# Patient Record
Sex: FEMALE | Race: BLACK OR AFRICAN AMERICAN | Employment: UNEMPLOYED
[De-identification: names, ages, dates, MRNs, and addresses within clinical notes are randomized per-mention and may not be internally consistent; named-entity substitution may affect disease eponyms.]

---

## 2017-01-13 ENCOUNTER — TELEPHONE (OUTPATIENT)
Dept: INTERNAL MEDICINE | Facility: CLINIC | Age: 52
End: 2017-01-13

## 2017-02-28 ENCOUNTER — TELEPHONE (OUTPATIENT)
Dept: INTERNAL MEDICINE | Facility: CLINIC | Age: 52
End: 2017-02-28

## 2017-05-22 DIAGNOSIS — I10 ESSENTIAL HYPERTENSION: ICD-10-CM

## 2017-05-22 RX ORDER — LOSARTAN POTASSIUM AND HYDROCHLOROTHIAZIDE 25; 100 MG/1; MG/1
TABLET ORAL
Qty: 30 TABLET | Refills: 2 | Status: SHIPPED | OUTPATIENT
Start: 2017-05-22 | End: 2017-06-01 | Stop reason: SDUPTHER

## 2017-06-01 ENCOUNTER — HOSPITAL ENCOUNTER (OUTPATIENT)
Age: 52
Setting detail: SPECIMEN
Discharge: HOME | End: 2017-06-01
Payer: MEDICARE

## 2017-06-01 ENCOUNTER — OFFICE VISIT (OUTPATIENT)
Dept: INTERNAL MEDICINE | Age: 52
End: 2017-06-01
Payer: MEDICARE

## 2017-06-01 VITALS
HEART RATE: 67 BPM | SYSTOLIC BLOOD PRESSURE: 158 MMHG | DIASTOLIC BLOOD PRESSURE: 84 MMHG | HEIGHT: 63 IN | BODY MASS INDEX: 44.93 KG/M2 | WEIGHT: 253.6 LBS

## 2017-06-01 DIAGNOSIS — Z13.9 SCREENING: ICD-10-CM

## 2017-06-01 DIAGNOSIS — I10 ESSENTIAL HYPERTENSION: ICD-10-CM

## 2017-06-01 DIAGNOSIS — R73.03 PREDIABETES: ICD-10-CM

## 2017-06-01 DIAGNOSIS — E78.00 PURE HYPERCHOLESTEROLEMIA: Primary | ICD-10-CM

## 2017-06-01 DIAGNOSIS — G56.03 BILATERAL CARPAL TUNNEL SYNDROME: ICD-10-CM

## 2017-06-01 DIAGNOSIS — E78.00 PURE HYPERCHOLESTEROLEMIA: ICD-10-CM

## 2017-06-01 DIAGNOSIS — G47.33 OSA (OBSTRUCTIVE SLEEP APNEA): ICD-10-CM

## 2017-06-01 LAB
ESTIMATED AVERAGE GLUCOSE: 131 MG/DL
HBA1C MFR BLD: 6.2 % (ref 4–6)
HEPATITIS C ANTIBODY: NONREACTIVE

## 2017-06-01 PROCEDURE — 86803 HEPATITIS C AB TEST: CPT

## 2017-06-01 PROCEDURE — 83036 HEMOGLOBIN GLYCOSYLATED A1C: CPT

## 2017-06-01 PROCEDURE — 99214 OFFICE O/P EST MOD 30 MIN: CPT | Performed by: INTERNAL MEDICINE

## 2017-06-01 RX ORDER — LOSARTAN POTASSIUM AND HYDROCHLOROTHIAZIDE 25; 100 MG/1; MG/1
1 TABLET ORAL DAILY
Qty: 30 TABLET | Refills: 11 | Status: SHIPPED | OUTPATIENT
Start: 2017-06-01 | End: 2017-06-22

## 2017-06-01 RX ORDER — AMLODIPINE BESYLATE 5 MG/1
5 TABLET ORAL DAILY
Qty: 30 TABLET | Refills: 11 | Status: SHIPPED | OUTPATIENT
Start: 2017-06-01 | End: 2017-06-22

## 2017-06-01 ASSESSMENT — PATIENT HEALTH QUESTIONNAIRE - PHQ9
SUM OF ALL RESPONSES TO PHQ9 QUESTIONS 1 & 2: 0
6. FEELING BAD ABOUT YOURSELF - OR THAT YOU ARE A FAILURE OR HAVE LET YOURSELF OR YOUR FAMILY DOWN: 0
9. THOUGHTS THAT YOU WOULD BE BETTER OFF DEAD, OR OF HURTING YOURSELF: 0
4. FEELING TIRED OR HAVING LITTLE ENERGY: 0
2. FEELING DOWN, DEPRESSED OR HOPELESS: 0
1. LITTLE INTEREST OR PLEASURE IN DOING THINGS: 0
8. MOVING OR SPEAKING SO SLOWLY THAT OTHER PEOPLE COULD HAVE NOTICED. OR THE OPPOSITE, BEING SO FIGETY OR RESTLESS THAT YOU HAVE BEEN MOVING AROUND A LOT MORE THAN USUAL: 0
5. POOR APPETITE OR OVEREATING: 0
SUM OF ALL RESPONSES TO PHQ QUESTIONS 1-9: 0
10. IF YOU CHECKED OFF ANY PROBLEMS, HOW DIFFICULT HAVE THESE PROBLEMS MADE IT FOR YOU TO DO YOUR WORK, TAKE CARE OF THINGS AT HOME, OR GET ALONG WITH OTHER PEOPLE: 0
3. TROUBLE FALLING OR STAYING ASLEEP: 0
7. TROUBLE CONCENTRATING ON THINGS, SUCH AS READING THE NEWSPAPER OR WATCHING TELEVISION: 0

## 2017-06-01 ASSESSMENT — ENCOUNTER SYMPTOMS
RESPIRATORY NEGATIVE: 1
EYES NEGATIVE: 1
GASTROINTESTINAL NEGATIVE: 1
ALLERGIC/IMMUNOLOGIC NEGATIVE: 1

## 2017-06-02 DIAGNOSIS — G47.33 OSA (OBSTRUCTIVE SLEEP APNEA): Primary | ICD-10-CM

## 2017-06-06 ENCOUNTER — TELEPHONE (OUTPATIENT)
Dept: INTERNAL MEDICINE | Age: 52
End: 2017-06-06

## 2017-06-22 ENCOUNTER — OFFICE VISIT (OUTPATIENT)
Dept: INTERNAL MEDICINE | Age: 52
End: 2017-06-22
Payer: MEDICARE

## 2017-06-22 ENCOUNTER — HOSPITAL ENCOUNTER (OUTPATIENT)
Dept: SLEEP CENTER | Age: 52
Discharge: HOME | End: 2017-06-22
Payer: MEDICARE

## 2017-06-22 ENCOUNTER — HOSPITAL ENCOUNTER (OUTPATIENT)
Age: 52
Setting detail: SPECIMEN
Discharge: HOME | End: 2017-06-22
Payer: MEDICARE

## 2017-06-22 VITALS
SYSTOLIC BLOOD PRESSURE: 141 MMHG | HEIGHT: 63 IN | DIASTOLIC BLOOD PRESSURE: 43 MMHG | HEART RATE: 66 BPM | WEIGHT: 255 LBS | BODY MASS INDEX: 45.18 KG/M2

## 2017-06-22 VITALS — HEIGHT: 63 IN | WEIGHT: 253 LBS | BODY MASS INDEX: 44.83 KG/M2

## 2017-06-22 DIAGNOSIS — G47.33 OSA (OBSTRUCTIVE SLEEP APNEA): ICD-10-CM

## 2017-06-22 DIAGNOSIS — Z12.4 PAP SMEAR FOR CERVICAL CANCER SCREENING: Primary | ICD-10-CM

## 2017-06-22 PROCEDURE — 99396 PREV VISIT EST AGE 40-64: CPT | Performed by: INTERNAL MEDICINE

## 2017-06-22 PROCEDURE — 95810 POLYSOM 6/> YRS 4/> PARAM: CPT

## 2017-06-22 RX ORDER — LOSARTAN POTASSIUM AND HYDROCHLOROTHIAZIDE 25; 100 MG/1; MG/1
100 TABLET ORAL DAILY
COMMUNITY
Start: 2017-04-08 | End: 2017-10-27 | Stop reason: SDUPTHER

## 2017-06-22 RX ORDER — NYSTATIN 100000 U/G
1000 CREAM TOPICAL
COMMUNITY
Start: 2017-04-08 | End: 2017-06-22 | Stop reason: SDUPTHER

## 2017-06-22 RX ORDER — NYSTATIN 100000 U/G
1000 CREAM TOPICAL PRN
Qty: 1 TUBE | Refills: 0 | Status: SHIPPED | OUTPATIENT
Start: 2017-06-22 | End: 2017-07-07 | Stop reason: SDUPTHER

## 2017-06-22 RX ORDER — AMLODIPINE BESYLATE 5 MG/1
5 TABLET ORAL
COMMUNITY
Start: 2017-06-01 | End: 2017-10-27 | Stop reason: SDUPTHER

## 2017-06-22 ASSESSMENT — SLEEP AND FATIGUE QUESTIONNAIRES
ESS TOTAL SCORE: 14
HOW LIKELY ARE YOU TO NOD OFF OR FALL ASLEEP IN A CAR, WHILE STOPPED FOR A FEW MINUTES IN TRAFFIC: 0
HOW LIKELY ARE YOU TO NOD OFF OR FALL ASLEEP WHILE SITTING AND TALKING TO SOMEONE: 0
HOW LIKELY ARE YOU TO NOD OFF OR FALL ASLEEP WHILE WATCHING TV: 3
HOW LIKELY ARE YOU TO NOD OFF OR FALL ASLEEP WHILE SITTING AND READING: 3
HOW LIKELY ARE YOU TO NOD OFF OR FALL ASLEEP WHILE LYING DOWN TO REST IN THE AFTERNOON WHEN CIRCUMSTANCES PERMIT: 3
NECK CIRCUMFERENCE (INCHES): 37
HOW LIKELY ARE YOU TO NOD OFF OR FALL ASLEEP WHEN YOU ARE A PASSENGER IN A CAR FOR AN HOUR WITHOUT A BREAK: 0
HOW LIKELY ARE YOU TO NOD OFF OR FALL ASLEEP WHILE SITTING QUIETLY AFTER LUNCH WITHOUT ALCOHOL: 3
HOW LIKELY ARE YOU TO NOD OFF OR FALL ASLEEP WHILE SITTING INACTIVE IN A PUBLIC PLACE: 2

## 2017-06-23 ENCOUNTER — HOSPITAL ENCOUNTER (OUTPATIENT)
Dept: MAMMOGRAPHY | Age: 52
Discharge: HOME | End: 2017-06-23
Payer: MEDICARE

## 2017-06-23 DIAGNOSIS — Z13.9 SCREENING: ICD-10-CM

## 2017-06-23 LAB — CYTOLOGY REPORT: NORMAL

## 2017-06-23 PROCEDURE — 77063 BREAST TOMOSYNTHESIS BI: CPT

## 2017-07-01 ENCOUNTER — HOSPITAL ENCOUNTER (OUTPATIENT)
Dept: SLEEP CENTER | Age: 52
Discharge: HOME | End: 2017-07-01
Payer: MEDICARE

## 2017-07-01 DIAGNOSIS — G47.33 OSA (OBSTRUCTIVE SLEEP APNEA): ICD-10-CM

## 2017-07-01 PROCEDURE — 95811 POLYSOM 6/>YRS CPAP 4/> PARM: CPT

## 2017-07-07 ENCOUNTER — TELEPHONE (OUTPATIENT)
Dept: INTERNAL MEDICINE | Age: 52
End: 2017-07-07

## 2017-07-07 RX ORDER — NYSTATIN 100000 U/G
CREAM TOPICAL DAILY PRN
Qty: 1 TUBE | Refills: 0 | Status: SHIPPED | OUTPATIENT
Start: 2017-07-07 | End: 2017-10-27 | Stop reason: SDUPTHER

## 2017-10-27 ENCOUNTER — OFFICE VISIT (OUTPATIENT)
Dept: INTERNAL MEDICINE | Age: 52
End: 2017-10-27
Payer: MEDICARE

## 2017-10-27 ENCOUNTER — HOSPITAL ENCOUNTER (OUTPATIENT)
Age: 52
Setting detail: SPECIMEN
Discharge: HOME | End: 2017-10-27
Payer: MEDICARE

## 2017-10-27 VITALS
HEIGHT: 63 IN | BODY MASS INDEX: 44.47 KG/M2 | WEIGHT: 251 LBS | DIASTOLIC BLOOD PRESSURE: 75 MMHG | HEART RATE: 72 BPM | OXYGEN SATURATION: 100 % | SYSTOLIC BLOOD PRESSURE: 139 MMHG

## 2017-10-27 DIAGNOSIS — E78.00 PURE HYPERCHOLESTEROLEMIA: ICD-10-CM

## 2017-10-27 DIAGNOSIS — M79.671 PAIN IN BOTH FEET: ICD-10-CM

## 2017-10-27 DIAGNOSIS — M79.672 PAIN IN BOTH FEET: ICD-10-CM

## 2017-10-27 DIAGNOSIS — R73.03 PREDIABETES: ICD-10-CM

## 2017-10-27 DIAGNOSIS — E66.01 MORBID OBESITY WITH BODY MASS INDEX (BMI) OF 40.0 OR HIGHER (HCC): ICD-10-CM

## 2017-10-27 DIAGNOSIS — Z99.89 OSA ON CPAP: ICD-10-CM

## 2017-10-27 DIAGNOSIS — D64.9 ANEMIA, UNSPECIFIED TYPE: ICD-10-CM

## 2017-10-27 DIAGNOSIS — G47.33 OSA ON CPAP: ICD-10-CM

## 2017-10-27 DIAGNOSIS — I10 ESSENTIAL HYPERTENSION: Primary | ICD-10-CM

## 2017-10-27 DIAGNOSIS — I10 ESSENTIAL HYPERTENSION: ICD-10-CM

## 2017-10-27 LAB
ANION GAP SERPL CALCULATED.3IONS-SCNC: 16 MMOL/L (ref 9–17)
BUN BLDV-MCNC: 11 MG/DL (ref 6–20)
BUN/CREAT BLD: NORMAL (ref 9–20)
CALCIUM SERPL-MCNC: 9.7 MG/DL (ref 8.6–10.4)
CHLORIDE BLD-SCNC: 98 MMOL/L (ref 98–107)
CHOLESTEROL/HDL RATIO: 4.4
CHOLESTEROL: 136 MG/DL
CO2: 25 MMOL/L (ref 20–31)
CREAT SERPL-MCNC: 0.58 MG/DL (ref 0.5–0.9)
ESTIMATED AVERAGE GLUCOSE: 134 MG/DL
FERRITIN: 84 UG/L (ref 13–150)
FOLATE: 6.5 NG/ML
GFR AFRICAN AMERICAN: >60 ML/MIN
GFR NON-AFRICAN AMERICAN: >60 ML/MIN
GFR SERPL CREATININE-BSD FRML MDRD: NORMAL ML/MIN/{1.73_M2}
GFR SERPL CREATININE-BSD FRML MDRD: NORMAL ML/MIN/{1.73_M2}
GLUCOSE BLD-MCNC: 97 MG/DL (ref 70–99)
HBA1C MFR BLD: 6.3 % (ref 4–6)
HCT VFR BLD CALC: 35.9 % (ref 36–46)
HDLC SERPL-MCNC: 31 MG/DL
HEMOGLOBIN: 11.6 G/DL (ref 12–16)
IRON SATURATION: 19 % (ref 20–55)
IRON: 56 UG/DL (ref 37–145)
LDL CHOLESTEROL: 85 MG/DL (ref 0–130)
MCH RBC QN AUTO: 27.5 PG (ref 26–34)
MCHC RBC AUTO-ENTMCNC: 32.5 G/DL (ref 31–37)
MCV RBC AUTO: 84.7 FL (ref 80–100)
PDW BLD-RTO: 14 % (ref 12.5–15.4)
PLATELET # BLD: 317 K/UL (ref 140–450)
PMV BLD AUTO: 9.7 FL (ref 6–12)
POTASSIUM SERPL-SCNC: 3.8 MMOL/L (ref 3.7–5.3)
RBC # BLD: 4.23 M/UL (ref 4–5.2)
SODIUM BLD-SCNC: 139 MMOL/L (ref 135–144)
TOTAL IRON BINDING CAPACITY: 295 UG/DL (ref 250–450)
TRIGL SERPL-MCNC: 99 MG/DL
TSH SERPL DL<=0.05 MIU/L-ACNC: 2.27 MIU/L (ref 0.3–5)
UNSATURATED IRON BINDING CAPACITY: 239 UG/DL (ref 112–347)
VITAMIN B-12: 525 PG/ML (ref 211–946)
VLDLC SERPL CALC-MCNC: ABNORMAL MG/DL (ref 1–30)
WBC # BLD: 7.9 K/UL (ref 3.5–11)

## 2017-10-27 PROCEDURE — 82728 ASSAY OF FERRITIN: CPT

## 2017-10-27 PROCEDURE — 82746 ASSAY OF FOLIC ACID SERUM: CPT

## 2017-10-27 PROCEDURE — 3014F SCREEN MAMMO DOC REV: CPT | Performed by: INTERNAL MEDICINE

## 2017-10-27 PROCEDURE — 80061 LIPID PANEL: CPT

## 2017-10-27 PROCEDURE — 1036F TOBACCO NON-USER: CPT | Performed by: INTERNAL MEDICINE

## 2017-10-27 PROCEDURE — 83036 HEMOGLOBIN GLYCOSYLATED A1C: CPT

## 2017-10-27 PROCEDURE — G8417 CALC BMI ABV UP PARAM F/U: HCPCS | Performed by: INTERNAL MEDICINE

## 2017-10-27 PROCEDURE — G8484 FLU IMMUNIZE NO ADMIN: HCPCS | Performed by: INTERNAL MEDICINE

## 2017-10-27 PROCEDURE — 82607 VITAMIN B-12: CPT

## 2017-10-27 PROCEDURE — 85027 COMPLETE CBC AUTOMATED: CPT

## 2017-10-27 PROCEDURE — 36415 COLL VENOUS BLD VENIPUNCTURE: CPT

## 2017-10-27 PROCEDURE — 3017F COLORECTAL CA SCREEN DOC REV: CPT | Performed by: INTERNAL MEDICINE

## 2017-10-27 PROCEDURE — G8427 DOCREV CUR MEDS BY ELIG CLIN: HCPCS | Performed by: INTERNAL MEDICINE

## 2017-10-27 PROCEDURE — 83550 IRON BINDING TEST: CPT

## 2017-10-27 PROCEDURE — 99214 OFFICE O/P EST MOD 30 MIN: CPT | Performed by: INTERNAL MEDICINE

## 2017-10-27 PROCEDURE — 84443 ASSAY THYROID STIM HORMONE: CPT

## 2017-10-27 PROCEDURE — 83540 ASSAY OF IRON: CPT

## 2017-10-27 PROCEDURE — 80048 BASIC METABOLIC PNL TOTAL CA: CPT

## 2017-10-27 RX ORDER — NYSTATIN 100000 U/G
CREAM TOPICAL DAILY PRN
Qty: 1 TUBE | Refills: 0 | Status: SHIPPED | OUTPATIENT
Start: 2017-10-27 | End: 2020-02-20 | Stop reason: SDUPTHER

## 2017-10-27 RX ORDER — LOSARTAN POTASSIUM AND HYDROCHLOROTHIAZIDE 25; 100 MG/1; MG/1
100 TABLET ORAL DAILY
Qty: 30 TABLET | Refills: 5 | Status: SHIPPED | OUTPATIENT
Start: 2017-10-27 | End: 2018-06-30 | Stop reason: SDUPTHER

## 2017-10-27 RX ORDER — AMLODIPINE BESYLATE 5 MG/1
5 TABLET ORAL DAILY
Qty: 30 TABLET | Refills: 5 | Status: SHIPPED | OUTPATIENT
Start: 2017-10-27 | End: 2018-06-25 | Stop reason: SDUPTHER

## 2017-10-27 ASSESSMENT — ENCOUNTER SYMPTOMS
BLURRED VISION: 0
EYE REDNESS: 0
NAUSEA: 0
CONSTIPATION: 0
SHORTNESS OF BREATH: 0
ABDOMINAL PAIN: 0
COUGH: 0

## 2017-10-27 NOTE — PROGRESS NOTES
[facility] MEDICINE [facility]    Progress Note    Date of patient's visit: 10/27/2017    Patient's Name:  Jan Brewer    YOB: 1965            Patient Care Team:  Lexie Carvalho MD as PCP - General    REASON FOR VISIT: Routine outpatient follow     Chief Complaint   Patient presents with    Diabetes     patient states that she is present to get dm shoes order   [facility] Maintenance     patient refused vaccines    Medication Refill     all meds         HISTORY OF PRESENT ILLNESS:    History was obtained from the patient. Jan Brewer is a 45 y.o. is here for Follow-up on her chronic medical problems including hypertension and prediabetes. She is morbidly obese. She says she's been having some pain in her feet and occasional tingling and numbness. She has history of prediabetes. She says she was diabetic shoes. She says she has been seeing Dr. Gely Dolan for her feet. He had prescribed insoles and she has been using them for the last 3 months but it hasn't helped. She has another appointment to see his partner next week. She drives a ambulance for medical transportation. She says she has to get in and out of the ambulance. She has MACHELLE which was recently diagnosed. She says she is using CPAP. She refuses flu or pneumococcal vaccines. She has prediabetes. She was started on metformin but she said she immediately started having diarrhea and refuses to take it anymore. Patient: Jan Brewer                                          :                                                         1965                      Date:  10/27/2016                         Procedure:  Colonoscopy      Indication:  Screening, average risk     Previous Colonoscopy:  None     Withdrawal Time:  10 minutes     Findings   1. Minimal diverticulosis  2. Small internal hemorrhoids     Recommendations  1. Repeat colonoscopy in ten (10) years.   Past Medical PHYSICAL EXAM:      Vitals:    10/27/17 1402   BP: 139/75   Site: Left Arm   Position: Sitting   Cuff Size: Large Adult   Pulse: 72   SpO2: 100%   Weight: 251 lb (113.9 kg)   Height: 5' 3\" (1.6 m)     Body mass index is 44.46 kg/m². BP Readings from Last 3 Encounters:   10/27/17 139/75   06/22/17 (!) 141/43   06/01/17 (!) 158/84        Wt Readings from Last 3 Encounters:   10/27/17 251 lb (113.9 kg)   06/22/17 253 lb (114.8 kg)   06/22/17 255 lb (115.7 kg)       Physical Exam      HENT:  Normocephalic, Atraumatic,  Neck- Normal range of motion, No tenderness, Supple, No stridor. Eyes:  PERRL, EOMI, Conjunctiva normal, No discharge. Respiratory:  Normal breath sounds, No respiratory distress, No wheezing, No chest tenderness. Cardiovascular:  Normal heart rate, Normal rhythm, No murmurs, No rubs, No gallops. GI:  Bowel sounds normal, Soft, No tenderness  Musculoskeletal:  Intact distal pulses, No edema  Integument:  Warm, Dry, No erythema, No rash. Lymphatic:  No lymphadenopathy noted. Neurologic:  Alert & oriented x 3, Normal motor function, Normal sensory function, No focal deficits noted.    Psychiatric:  Affect normal    LABORATORY FINDINGS:    CBC:  Lab Results   Component Value Date    WBC 7.4 08/08/2016    HGB 10.9 08/08/2016     08/08/2016     04/29/2012     BMP:    Lab Results   Component Value Date     08/08/2016    K 4.0 08/08/2016     08/08/2016    CO2 28 08/08/2016    BUN 16 08/08/2016    CREATININE 0.81 08/08/2016    GLUCOSE 139 08/08/2016    GLUCOSE 119 04/29/2012     HEMOGLOBIN A1C:   Lab Results   Component Value Date    LABA1C 6.2 06/01/2017     MICROALBUMIN URINE: No results found for: MICROALBUR  FASTING LIPID PANEL:  Lab Results   Component Value Date    CHOL 133 05/13/2016    HDL 31 (L) 05/13/2016    TRIG 114 05/13/2016     Lab Results   Component Value Date    LDLCHOLESTEROL 79 05/13/2016       LIVER PROFILE:  Lab Results   Component Value Date    ALT 14 08/08/2016    AST 18 08/08/2016    PROT 7.8 08/08/2016    BILITOT 0.51 08/08/2016    BILIDIR 0.10 08/08/2016    LABALBU 3.9 08/08/2016      THYROID FUNCTION:   Lab Results   Component Value Date    TSH 1.73 05/03/2013      URINE ANALYSIS: No results found for: LABURIN  ASSESSMENT AND PLAN:    1. Essential hypertension  Same meds    - Basic Metabolic Panel; Future  - TSH without Reflex; Future    2. Prediabetes  Refuses Metformin  Monitor    - Hemoglobin A1C; Future  - TSH without Reflex; Future  - Lipid Panel; Future    3. Pain in both feet  Follow up with Podiatry  Get office notes      4. MACHELLE on CPAP  Weight loss      5. Anemia, unspecified type    - CBC; Future  - Ferritin; Future  - Iron and TIBC; Future  - Vitamin B12 & Folate; Future    6. Pure hypercholesterolemia  Statins    - Lipid Panel; Future    7. Morbid obesity with body mass index (BMI) of 40.0 or higher (HCC)  Diet changes    - TSH without Reflex; Future          FOLLOW UP AND INSTRUCTIONS:   Return in about 3 months (around 1/27/2018). 1. Roxie received counseling on the following healthy behaviors: nutrition, exercise and medication adherence    2. Reviewed prior labs and health maintenance. 3. Discussed use, benefit, and side effects of prescribed medications. Barriers to medication compliance addressed. All patient questions answered. Pt voiced understanding.        Gil Travis  Attending Physician, [address] [place] [address], Internal Medicine Residency Program  [address] [place] [address]  10/27/2017, 2:15 PM

## 2018-06-30 RX ORDER — LOSARTAN POTASSIUM AND HYDROCHLOROTHIAZIDE 25; 100 MG/1; MG/1
1 TABLET ORAL DAILY
Qty: 30 TABLET | Refills: 5 | Status: SHIPPED | OUTPATIENT
Start: 2018-06-30 | End: 2018-06-30 | Stop reason: SDUPTHER

## 2018-06-30 RX ORDER — LOSARTAN POTASSIUM AND HYDROCHLOROTHIAZIDE 25; 100 MG/1; MG/1
1 TABLET ORAL DAILY
Qty: 30 TABLET | Refills: 5 | Status: SHIPPED | OUTPATIENT
Start: 2018-06-30 | End: 2018-07-01 | Stop reason: SDUPTHER

## 2018-07-11 ENCOUNTER — OFFICE VISIT (OUTPATIENT)
Dept: INTERNAL MEDICINE | Age: 53
End: 2018-07-11
Payer: MEDICARE

## 2018-07-11 VITALS
BODY MASS INDEX: 45 KG/M2 | HEIGHT: 63 IN | SYSTOLIC BLOOD PRESSURE: 134 MMHG | DIASTOLIC BLOOD PRESSURE: 70 MMHG | HEART RATE: 73 BPM | WEIGHT: 254 LBS

## 2018-07-11 DIAGNOSIS — I10 ESSENTIAL HYPERTENSION: ICD-10-CM

## 2018-07-11 DIAGNOSIS — G89.29 CHRONIC BILATERAL LOW BACK PAIN WITH LEFT-SIDED SCIATICA: ICD-10-CM

## 2018-07-11 DIAGNOSIS — R73.02 IGT (IMPAIRED GLUCOSE TOLERANCE): Primary | ICD-10-CM

## 2018-07-11 DIAGNOSIS — E66.01 MORBID OBESITY WITH BMI OF 40.0-44.9, ADULT (HCC): ICD-10-CM

## 2018-07-11 DIAGNOSIS — G47.33 OSA ON CPAP: ICD-10-CM

## 2018-07-11 DIAGNOSIS — Z99.89 OSA ON CPAP: ICD-10-CM

## 2018-07-11 DIAGNOSIS — M54.42 CHRONIC BILATERAL LOW BACK PAIN WITH LEFT-SIDED SCIATICA: ICD-10-CM

## 2018-07-11 DIAGNOSIS — Z12.39 BREAST CANCER SCREENING: ICD-10-CM

## 2018-07-11 LAB — HBA1C MFR BLD: 6.4 %

## 2018-07-11 PROCEDURE — 1036F TOBACCO NON-USER: CPT | Performed by: INTERNAL MEDICINE

## 2018-07-11 PROCEDURE — 83036 HEMOGLOBIN GLYCOSYLATED A1C: CPT | Performed by: INTERNAL MEDICINE

## 2018-07-11 PROCEDURE — 3017F COLORECTAL CA SCREEN DOC REV: CPT | Performed by: INTERNAL MEDICINE

## 2018-07-11 PROCEDURE — G8427 DOCREV CUR MEDS BY ELIG CLIN: HCPCS | Performed by: INTERNAL MEDICINE

## 2018-07-11 PROCEDURE — 99213 OFFICE O/P EST LOW 20 MIN: CPT | Performed by: INTERNAL MEDICINE

## 2018-07-11 PROCEDURE — G8417 CALC BMI ABV UP PARAM F/U: HCPCS | Performed by: INTERNAL MEDICINE

## 2018-07-11 RX ORDER — LOSARTAN POTASSIUM AND HYDROCHLOROTHIAZIDE 25; 100 MG/1; MG/1
TABLET ORAL
Qty: 90 TABLET | Refills: 1 | Status: SHIPPED | OUTPATIENT
Start: 2018-07-11 | End: 2018-10-18 | Stop reason: SDUPTHER

## 2018-07-11 RX ORDER — METFORMIN HYDROCHLORIDE 500 MG/1
500 TABLET, EXTENDED RELEASE ORAL
Qty: 30 TABLET | Refills: 3 | Status: SHIPPED | OUTPATIENT
Start: 2018-07-11 | End: 2018-10-18

## 2018-07-11 RX ORDER — AMLODIPINE BESYLATE 5 MG/1
TABLET ORAL
Qty: 90 TABLET | Refills: 1 | Status: SHIPPED | OUTPATIENT
Start: 2018-07-11 | End: 2018-10-18 | Stop reason: SDUPTHER

## 2018-07-11 ASSESSMENT — ENCOUNTER SYMPTOMS
SHORTNESS OF BREATH: 0
BACK PAIN: 1
EYE REDNESS: 0
SPUTUM PRODUCTION: 0
ABDOMINAL PAIN: 0
COUGH: 0
BLURRED VISION: 0
CONSTIPATION: 0
NAUSEA: 0

## 2018-07-11 ASSESSMENT — PATIENT HEALTH QUESTIONNAIRE - PHQ9
SUM OF ALL RESPONSES TO PHQ QUESTIONS 1-9: 0
SUM OF ALL RESPONSES TO PHQ9 QUESTIONS 1 & 2: 0
2. FEELING DOWN, DEPRESSED OR HOPELESS: 0
1. LITTLE INTEREST OR PLEASURE IN DOING THINGS: 0

## 2018-07-11 NOTE — PROGRESS NOTES
Results   Component Value Date    LDLCHOLESTEROL 85 10/27/2017       LIVER PROFILE:  Lab Results   Component Value Date    ALT 14 08/08/2016    AST 18 08/08/2016    PROT 7.8 08/08/2016    BILITOT 0.51 08/08/2016    BILIDIR 0.10 08/08/2016    LABALBU 3.9 08/08/2016      THYROID FUNCTION:   Lab Results   Component Value Date    TSH 2.27 10/27/2017      URINE ANALYSIS: No results found for: LABURIN  ASSESSMENT AND PLAN:    1. IGT (impaired glucose tolerance)    - POCT glycosylated hemoglobin (Hb A1C)  - metFORMIN (GLUCOPHAGE XR) 500 MG extended release tablet; Take 1 tablet by mouth daily (with breakfast)  Dispense: 30 tablet; Refill: 3    2. Essential hypertension    - amLODIPine (NORVASC) 5 MG tablet; TAKE ONE TABLET BY MOUTH DAILY  Dispense: 90 tablet; Refill: 1  - losartan-hydrochlorothiazide (HYZAAR) 100-25 MG per tablet; TAKE ONE TABLET BY MOUTH DAILY  Dispense: 90 tablet; Refill: 1    3. Chronic bilateral low back pain with left-sided sciatica    - [facility]    4. MACHELLE on CPAP  Advised to call company to have cpap checked  Numbers provided    5. Morbid obesity with BMI of 40.0-44.9, adult (HCC)  Diet changes  exercise    6. Breast cancer screening    - [facility] DIGITAL SCREEN W CAD BILATERAL; Future          FOLLOW UP AND INSTRUCTIONS:   Return in about 3 months (around 10/11/2018). 1. Roxie received counseling on the following healthy behaviors: nutrition, exercise and medication adherence    2. Reviewed prior labs and health maintenance. 3. Discussed use, benefit, and side effects of prescribed medications. Barriers to medication compliance addressed. All patient questions answered. Pt voiced understanding.      4. Patient given educational materials - see patient instructions    Daljit Ocasio  Attending Physician, [address] [place] [address], Internal Medicine Residency Program  [address] [facility]  7/11/2018, 4:26 PM

## 2018-07-30 ENCOUNTER — TELEPHONE (OUTPATIENT)
Dept: INTERNAL MEDICINE | Age: 53
End: 2018-07-30

## 2018-07-30 RX ORDER — AMLODIPINE BESYLATE 5 MG/1
TABLET ORAL
Qty: 30 TABLET | Refills: 0 | Status: SHIPPED | OUTPATIENT
Start: 2018-07-30 | End: 2018-10-18 | Stop reason: SDUPTHER

## 2018-07-30 NOTE — TELEPHONE ENCOUNTER
Metformin does cause diarrhea. It sometimes improves after few days.  If persistent can stop metformin if desired

## 2018-08-06 ENCOUNTER — HOSPITAL ENCOUNTER (OUTPATIENT)
Dept: MAMMOGRAPHY | Age: 53
Discharge: HOME | End: 2018-08-08
Payer: MEDICARE

## 2018-08-06 DIAGNOSIS — Z12.39 BREAST CANCER SCREENING: ICD-10-CM

## 2018-08-06 PROCEDURE — 77063 BREAST TOMOSYNTHESIS BI: CPT

## 2018-10-18 ENCOUNTER — OFFICE VISIT (OUTPATIENT)
Dept: INTERNAL MEDICINE | Age: 53
End: 2018-10-18
Payer: MEDICARE

## 2018-10-18 VITALS
WEIGHT: 247 LBS | DIASTOLIC BLOOD PRESSURE: 73 MMHG | HEIGHT: 63 IN | HEART RATE: 73 BPM | SYSTOLIC BLOOD PRESSURE: 142 MMHG | BODY MASS INDEX: 43.77 KG/M2

## 2018-10-18 DIAGNOSIS — G47.33 OSA ON CPAP: ICD-10-CM

## 2018-10-18 DIAGNOSIS — K21.9 GASTROESOPHAGEAL REFLUX DISEASE, ESOPHAGITIS PRESENCE NOT SPECIFIED: ICD-10-CM

## 2018-10-18 DIAGNOSIS — R73.02 IGT (IMPAIRED GLUCOSE TOLERANCE): ICD-10-CM

## 2018-10-18 DIAGNOSIS — I10 ESSENTIAL HYPERTENSION: Primary | ICD-10-CM

## 2018-10-18 DIAGNOSIS — R20.0 NUMBNESS OF FEET: ICD-10-CM

## 2018-10-18 DIAGNOSIS — E66.01 MORBID OBESITY WITH BMI OF 40.0-44.9, ADULT (HCC): ICD-10-CM

## 2018-10-18 DIAGNOSIS — E78.00 PURE HYPERCHOLESTEROLEMIA: ICD-10-CM

## 2018-10-18 DIAGNOSIS — Z99.89 OSA ON CPAP: ICD-10-CM

## 2018-10-18 DIAGNOSIS — D64.9 CHRONIC ANEMIA: ICD-10-CM

## 2018-10-18 LAB — HBA1C MFR BLD: 6.1 %

## 2018-10-18 PROCEDURE — 99214 OFFICE O/P EST MOD 30 MIN: CPT | Performed by: INTERNAL MEDICINE

## 2018-10-18 PROCEDURE — 99211 OFF/OP EST MAY X REQ PHY/QHP: CPT | Performed by: INTERNAL MEDICINE

## 2018-10-18 PROCEDURE — 3017F COLORECTAL CA SCREEN DOC REV: CPT | Performed by: INTERNAL MEDICINE

## 2018-10-18 PROCEDURE — G8417 CALC BMI ABV UP PARAM F/U: HCPCS | Performed by: INTERNAL MEDICINE

## 2018-10-18 PROCEDURE — 83036 HEMOGLOBIN GLYCOSYLATED A1C: CPT | Performed by: INTERNAL MEDICINE

## 2018-10-18 PROCEDURE — G8484 FLU IMMUNIZE NO ADMIN: HCPCS | Performed by: INTERNAL MEDICINE

## 2018-10-18 PROCEDURE — G8427 DOCREV CUR MEDS BY ELIG CLIN: HCPCS | Performed by: INTERNAL MEDICINE

## 2018-10-18 PROCEDURE — 1036F TOBACCO NON-USER: CPT | Performed by: INTERNAL MEDICINE

## 2018-10-18 RX ORDER — OMEPRAZOLE 20 MG/1
20 CAPSULE, DELAYED RELEASE ORAL DAILY
Qty: 30 CAPSULE | Refills: 3 | Status: SHIPPED | OUTPATIENT
Start: 2018-10-18 | End: 2019-02-26 | Stop reason: SDUPTHER

## 2018-10-18 RX ORDER — LOSARTAN POTASSIUM AND HYDROCHLOROTHIAZIDE 25; 100 MG/1; MG/1
TABLET ORAL
Qty: 90 TABLET | Refills: 1 | Status: SHIPPED | OUTPATIENT
Start: 2018-10-18 | End: 2019-02-26 | Stop reason: SDUPTHER

## 2018-10-18 RX ORDER — BLOOD PRESSURE TEST KIT
KIT MISCELLANEOUS
Qty: 1 DEVICE | Refills: 0 | Status: SHIPPED | OUTPATIENT
Start: 2018-10-18 | End: 2019-02-26 | Stop reason: SDUPTHER

## 2018-10-18 RX ORDER — AMLODIPINE BESYLATE 10 MG/1
TABLET ORAL
Qty: 90 TABLET | Refills: 1 | Status: SHIPPED | OUTPATIENT
Start: 2018-10-18 | End: 2019-02-26 | Stop reason: SDUPTHER

## 2018-10-18 NOTE — PROGRESS NOTES
[facility] MEDICINE [facility]OC[facility]    Progress Note    Date of patient's visit: 10/18/2018    Patient's Name:  Julieta Rowley    YOB: 1965            Patient Care Team:  Bruno Manriquez MD as PCP - General (Internal Medicine)  Bruno Manriquez MD as PCP - S Attributed Provider    REASON FOR VISIT: Routine outpatient follow     Chief Complaint   Patient presents with    Diabetes     Pt states that she is not taking metforim     Hypertension    Health Maintenance     refused vaccines          HISTORY OF PRESENT ILLNESS:    History was obtained from the patient. Julieta Rowley is a 52 y.o. is here for Follow-up on her chronic medical problems including hypertension, impaired glucose tolerance, mild hyperlipidemia, obstructive sleep apnea on CPAP, obesity. She could not tolerate metformin as it caused diarrhea. A1c is 6.1 and will continue to monitor. She agrees to see diabetic education for diet counseling. She is morbidly obese. She is interested in weight loss surgery. She would like to be referred to bariatric surgery. She continues to have pain and numbness  in her feet. She had some cortisone injections by podiatry which helped. She was given orthotics but she says they're very hard and causes pain. She wants to follow up with podiatry again. She wants diabetic shoes. She is complaining of GERD symptoms. She this has been going on for sometime. She has been taking Tums over-the-counter. She denies blood in her stools. She had a colonoscopy last year. She has never had an EGD.       Results for POC orders placed in visit on 10/18/18   POCT glycosylated hemoglobin (Hb A1C)   Result Value Ref Range    Hemoglobin A1C 6.1 %            Patient: Julieta Rowley                      :                           1965            Date:  10/27/2016               Procedure:  Colonoscopy      Indication:  Screening, average risk     Previous Colonoscopy: None     Withdrawal Time:  10 minutes     Findings   1. Minimal diverticulosis  2. Small internal hemorrhoids     Recommendations  1. Repeat colonoscopy in ten (10) years.          Past Medical History:   Diagnosis Date    Hyperlipidemia     Hypertension     Obesity        Past Surgical History:   Procedure Laterality Date     SECTION      CHOLECYSTECTOMY           ALLERGIES      Allergies   Allergen Reactions    Codeine     Dicloxacillin     Pcn [Penicillins]        MEDICATIONS:      Current Outpatient Prescriptions on File Prior to Visit   Medication Sig Dispense Refill    amLODIPine (NORVASC) 5 MG tablet TAKE ONE TABLET BY MOUTH DAILY 30 tablet 0    losartan-hydrochlorothiazide (HYZAAR) 100-25 MG per tablet TAKE ONE TABLET BY MOUTH DAILY 90 tablet 1    nystatin (MYCOSTATIN) 068437 UNIT/GM cream Apply topically daily as needed for Dry Skin 1 Tube 0     No current facility-administered medications on file prior to visit. SOCIAL HISTORY    Reviewed and no change from previous record. Aubree Antony  reports that she has never smoked. She has never used smokeless tobacco.    FAMILY HISTORY:    Reviewed and No change from previous visit    HEALTH MAINTENANCE DUE:      Health Maintenance Due   Topic Date Due    DTaP/Tdap/Td vaccine (1 - Tdap) 1982    Shingles Vaccine (1 of 2 - 2 Dose Series) 2013    Flu vaccine (1) 2018    Potassium monitoring  10/27/2018    Creatinine monitoring  10/27/2018       REVIEW OF SYSTEMS:    12 point review of symptoms completed and found to be normal except noted in the HPI    Review of Systems   Constitutional: Positive for fatigue. Negative for chills and fever. Eyes: Negative for pain and visual disturbance. Respiratory: Negative for cough, shortness of breath and wheezing. Cardiovascular: Negative for chest pain, palpitations and leg swelling. Gastrointestinal: Negative for abdominal pain, blood in stool, constipation and nausea. GERD   Endocrine: Negative for polydipsia and polyuria. Musculoskeletal: Positive for arthralgias. Negative for gait problem and joint swelling. Allergic/Immunologic: Negative for environmental allergies and immunocompromised state. Neurological: Positive for numbness. Negative for dizziness, tremors, weakness and headaches. Hematological: Negative for adenopathy. Does not bruise/bleed easily. Psychiatric/Behavioral: Negative for dysphoric mood. The patient is not nervous/anxious. PHYSICAL EXAM:     Vitals:    10/18/18 1515 10/18/18 1552   BP: (!) 144/73 (!) 142/73   Site: Right Upper Arm Left Upper Arm   Position: Sitting Sitting   Cuff Size: Large Adult Large Adult   Pulse: 73 73   Weight: 247 lb (112 kg)    Height: 5' 3\" (1.6 m)      Body mass index is 43.75 kg/m². BP Readings from Last 3 Encounters:   10/18/18 (!) 142/73   07/11/18 134/70   10/27/17 139/75        Wt Readings from Last 3 Encounters:   10/18/18 247 lb (112 kg)   07/11/18 254 lb (115.2 kg)   10/27/17 251 lb (113.9 kg)       Physical Exam      HENT:  Normocephalic, Atraumatic,  Neck- Normal range of motion, No tenderness, Supple, No stridor. Eyes:  PERRL, EOMI, Conjunctiva normal, No discharge. Respiratory:  Normal breath sounds, No respiratory distress, No wheezing, No chest tenderness. Cardiovascular:  Normal heart rate, Normal rhythm, No murmurs  GI:  Bowel sounds normal, Soft, No tenderness, No masses  :   No CVA tenderness. Musculoskeletal:  Intact distal pulses, No edema, No tenderness,  Good range of motion in all major joints. No tenderness to palpation or major deformities noted. Back- No tenderness. Integument:  Warm, Dry, No erythema, No rash. Lymphatic:  No lymphadenopathy noted. Neurologic:  Alert & oriented x 3, Normal motor function, Normal sensory function, No focal deficits noted.      LABORATORY FINDINGS:    CBC:  Lab Results   Component Value Date    WBC 7.9 10/27/2017    HGB 11.6 10/27/2017

## 2018-10-21 ASSESSMENT — ENCOUNTER SYMPTOMS
ABDOMINAL PAIN: 0
EYE PAIN: 0
WHEEZING: 0
BLOOD IN STOOL: 0
COUGH: 0
SHORTNESS OF BREATH: 0
CONSTIPATION: 0
NAUSEA: 0

## 2018-11-26 ENCOUNTER — TELEPHONE (OUTPATIENT)
Dept: BARIATRICS/WEIGHT MGMT | Age: 53
End: 2018-11-26

## 2019-02-13 ENCOUNTER — APPOINTMENT (OUTPATIENT)
Dept: GENERAL RADIOLOGY | Age: 54
End: 2019-02-13
Payer: MEDICARE

## 2019-02-13 ENCOUNTER — HOSPITAL ENCOUNTER (EMERGENCY)
Age: 54
Discharge: HOME | End: 2019-02-13
Attending: EMERGENCY MEDICINE
Payer: MEDICARE

## 2019-02-13 VITALS
HEART RATE: 85 BPM | WEIGHT: 249.5 LBS | HEIGHT: 63 IN | TEMPERATURE: 97.8 F | DIASTOLIC BLOOD PRESSURE: 70 MMHG | OXYGEN SATURATION: 99 % | BODY MASS INDEX: 44.21 KG/M2 | SYSTOLIC BLOOD PRESSURE: 135 MMHG | RESPIRATION RATE: 18 BRPM

## 2019-02-13 DIAGNOSIS — M54.50 ACUTE RIGHT-SIDED LOW BACK PAIN WITHOUT SCIATICA: Primary | ICD-10-CM

## 2019-02-13 PROCEDURE — 72100 X-RAY EXAM L-S SPINE 2/3 VWS: CPT

## 2019-02-13 PROCEDURE — 81003 URINALYSIS AUTO W/O SCOPE: CPT

## 2019-02-13 PROCEDURE — 96372 THER/PROPH/DIAG INJ SC/IM: CPT

## 2019-02-13 PROCEDURE — 99283 EMERGENCY DEPT VISIT LOW MDM: CPT

## 2019-02-13 PROCEDURE — 6360000002 HC RX W HCPCS: Performed by: EMERGENCY MEDICINE

## 2019-02-13 RX ORDER — IBUPROFEN 800 MG/1
800 TABLET ORAL EVERY 8 HOURS PRN
Qty: 20 TABLET | Refills: 0 | Status: SHIPPED | OUTPATIENT
Start: 2019-02-13 | End: 2019-02-26 | Stop reason: SDUPTHER

## 2019-02-13 RX ORDER — TRAMADOL HYDROCHLORIDE 50 MG/1
50 TABLET ORAL EVERY 6 HOURS PRN
Qty: 20 TABLET | Refills: 0 | Status: SHIPPED | OUTPATIENT
Start: 2019-02-13 | End: 2019-02-18

## 2019-02-13 RX ORDER — CYCLOBENZAPRINE HCL 10 MG
10 TABLET ORAL EVERY 8 HOURS PRN
Qty: 20 TABLET | Refills: 0 | Status: SHIPPED | OUTPATIENT
Start: 2019-02-13 | End: 2019-02-26 | Stop reason: SDUPTHER

## 2019-02-13 RX ORDER — KETOROLAC TROMETHAMINE 30 MG/ML
60 INJECTION, SOLUTION INTRAMUSCULAR; INTRAVENOUS ONCE
Status: COMPLETED | OUTPATIENT
Start: 2019-02-13 | End: 2019-02-13

## 2019-02-13 RX ADMIN — KETOROLAC TROMETHAMINE 60 MG: 30 INJECTION, SOLUTION INTRAMUSCULAR at 07:28

## 2019-02-13 ASSESSMENT — PAIN DESCRIPTION - DESCRIPTORS: DESCRIPTORS: ACHING

## 2019-02-13 ASSESSMENT — ENCOUNTER SYMPTOMS
EYE REDNESS: 0
BACK PAIN: 1
DIARRHEA: 0
SHORTNESS OF BREATH: 0
ABDOMINAL PAIN: 0
FACIAL SWELLING: 0
CONSTIPATION: 0
VOMITING: 0
COLOR CHANGE: 0
COUGH: 0
EYE DISCHARGE: 0

## 2019-02-13 ASSESSMENT — PAIN SCALES - GENERAL
PAINLEVEL_OUTOF10: 8
PAINLEVEL_OUTOF10: 8

## 2019-02-13 ASSESSMENT — PAIN DESCRIPTION - PAIN TYPE: TYPE: ACUTE PAIN

## 2019-02-26 ENCOUNTER — OFFICE VISIT (OUTPATIENT)
Dept: INTERNAL MEDICINE | Age: 54
End: 2019-02-26
Payer: MEDICARE

## 2019-02-26 VITALS
HEART RATE: 84 BPM | BODY MASS INDEX: 44.12 KG/M2 | WEIGHT: 249 LBS | HEIGHT: 63 IN | SYSTOLIC BLOOD PRESSURE: 124 MMHG | DIASTOLIC BLOOD PRESSURE: 72 MMHG

## 2019-02-26 DIAGNOSIS — Z99.89 OSA ON CPAP: ICD-10-CM

## 2019-02-26 DIAGNOSIS — I10 ESSENTIAL HYPERTENSION: Primary | ICD-10-CM

## 2019-02-26 DIAGNOSIS — E66.01 MORBID OBESITY (HCC): ICD-10-CM

## 2019-02-26 DIAGNOSIS — G47.33 OSA ON CPAP: ICD-10-CM

## 2019-02-26 DIAGNOSIS — R73.02 IGT (IMPAIRED GLUCOSE TOLERANCE): ICD-10-CM

## 2019-02-26 DIAGNOSIS — K21.9 GASTROESOPHAGEAL REFLUX DISEASE, ESOPHAGITIS PRESENCE NOT SPECIFIED: ICD-10-CM

## 2019-02-26 DIAGNOSIS — R20.0 NUMBNESS OF FEET: ICD-10-CM

## 2019-02-26 PROCEDURE — 3017F COLORECTAL CA SCREEN DOC REV: CPT | Performed by: INTERNAL MEDICINE

## 2019-02-26 PROCEDURE — 1036F TOBACCO NON-USER: CPT | Performed by: INTERNAL MEDICINE

## 2019-02-26 PROCEDURE — G8417 CALC BMI ABV UP PARAM F/U: HCPCS | Performed by: INTERNAL MEDICINE

## 2019-02-26 PROCEDURE — 99211 OFF/OP EST MAY X REQ PHY/QHP: CPT | Performed by: INTERNAL MEDICINE

## 2019-02-26 PROCEDURE — G8484 FLU IMMUNIZE NO ADMIN: HCPCS | Performed by: INTERNAL MEDICINE

## 2019-02-26 PROCEDURE — 99213 OFFICE O/P EST LOW 20 MIN: CPT | Performed by: INTERNAL MEDICINE

## 2019-02-26 PROCEDURE — G8427 DOCREV CUR MEDS BY ELIG CLIN: HCPCS | Performed by: INTERNAL MEDICINE

## 2019-02-26 RX ORDER — BLOOD PRESSURE TEST KIT
KIT MISCELLANEOUS
Qty: 1 DEVICE | Refills: 0 | Status: SHIPPED | OUTPATIENT
Start: 2019-02-26 | End: 2019-09-24

## 2019-02-26 RX ORDER — CYCLOBENZAPRINE HCL 10 MG
10 TABLET ORAL EVERY 8 HOURS PRN
Qty: 30 TABLET | Refills: 0 | Status: SHIPPED | OUTPATIENT
Start: 2019-02-26 | End: 2020-02-20 | Stop reason: ALTCHOICE

## 2019-02-26 RX ORDER — IBUPROFEN 800 MG/1
800 TABLET ORAL EVERY 8 HOURS PRN
Qty: 20 TABLET | Refills: 0 | Status: SHIPPED | OUTPATIENT
Start: 2019-02-26 | End: 2020-02-20 | Stop reason: ALTCHOICE

## 2019-02-26 RX ORDER — LOSARTAN POTASSIUM AND HYDROCHLOROTHIAZIDE 25; 100 MG/1; MG/1
TABLET ORAL
Qty: 90 TABLET | Refills: 1 | Status: SHIPPED | OUTPATIENT
Start: 2019-02-26 | End: 2019-10-21 | Stop reason: SDUPTHER

## 2019-02-26 RX ORDER — AMLODIPINE BESYLATE 10 MG/1
TABLET ORAL
Qty: 90 TABLET | Refills: 1 | Status: SHIPPED | OUTPATIENT
Start: 2019-02-26 | End: 2019-10-21 | Stop reason: SDUPTHER

## 2019-02-26 RX ORDER — OMEPRAZOLE 20 MG/1
20 CAPSULE, DELAYED RELEASE ORAL DAILY
Qty: 30 CAPSULE | Refills: 3 | Status: SHIPPED | OUTPATIENT
Start: 2019-02-26 | End: 2020-02-05 | Stop reason: SDUPTHER

## 2019-02-26 ASSESSMENT — ENCOUNTER SYMPTOMS
WHEEZING: 0
BLOOD IN STOOL: 0
SHORTNESS OF BREATH: 0
COUGH: 0
CONSTIPATION: 0
ABDOMINAL PAIN: 0
DIARRHEA: 0
BACK PAIN: 1

## 2019-02-26 ASSESSMENT — PATIENT HEALTH QUESTIONNAIRE - PHQ9
SUM OF ALL RESPONSES TO PHQ QUESTIONS 1-9: 0
1. LITTLE INTEREST OR PLEASURE IN DOING THINGS: 0
SUM OF ALL RESPONSES TO PHQ9 QUESTIONS 1 & 2: 0
2. FEELING DOWN, DEPRESSED OR HOPELESS: 0
SUM OF ALL RESPONSES TO PHQ QUESTIONS 1-9: 0

## 2019-02-27 ASSESSMENT — ENCOUNTER SYMPTOMS: PHOTOPHOBIA: 0

## 2019-03-01 ENCOUNTER — HOSPITAL ENCOUNTER (OUTPATIENT)
Age: 54
Setting detail: SPECIMEN
Discharge: HOME | End: 2019-03-01
Payer: MEDICARE

## 2019-03-01 DIAGNOSIS — R20.0 NUMBNESS OF FEET: ICD-10-CM

## 2019-03-01 DIAGNOSIS — I10 ESSENTIAL HYPERTENSION: ICD-10-CM

## 2019-03-01 DIAGNOSIS — E66.01 MORBID OBESITY WITH BMI OF 40.0-44.9, ADULT (HCC): ICD-10-CM

## 2019-03-01 DIAGNOSIS — R73.02 IGT (IMPAIRED GLUCOSE TOLERANCE): ICD-10-CM

## 2019-03-01 DIAGNOSIS — E78.00 PURE HYPERCHOLESTEROLEMIA: ICD-10-CM

## 2019-03-01 DIAGNOSIS — K21.9 GASTROESOPHAGEAL REFLUX DISEASE, ESOPHAGITIS PRESENCE NOT SPECIFIED: ICD-10-CM

## 2019-03-01 DIAGNOSIS — D64.9 CHRONIC ANEMIA: ICD-10-CM

## 2019-03-01 LAB
ABSOLUTE EOS #: 0.11 K/UL (ref 0–0.44)
ABSOLUTE IMMATURE GRANULOCYTE: <0.03 K/UL (ref 0–0.3)
ABSOLUTE LYMPH #: 2.97 K/UL (ref 1.1–3.7)
ABSOLUTE MONO #: 0.46 K/UL (ref 0.1–1.2)
ANION GAP SERPL CALCULATED.3IONS-SCNC: 13 MMOL/L (ref 9–17)
BASOPHILS # BLD: 1 % (ref 0–2)
BASOPHILS ABSOLUTE: 0.05 K/UL (ref 0–0.2)
BUN BLDV-MCNC: 12 MG/DL (ref 6–20)
BUN/CREAT BLD: ABNORMAL (ref 9–20)
CALCIUM SERPL-MCNC: 9.8 MG/DL (ref 8.6–10.4)
CHLORIDE BLD-SCNC: 100 MMOL/L (ref 98–107)
CHOLESTEROL/HDL RATIO: 4.2
CHOLESTEROL: 161 MG/DL
CO2: 26 MMOL/L (ref 20–31)
CREAT SERPL-MCNC: 0.84 MG/DL (ref 0.5–0.9)
CREATININE URINE: 380.3 MG/DL (ref 28–217)
DIFFERENTIAL TYPE: NORMAL
EOSINOPHILS RELATIVE PERCENT: 1 % (ref 1–4)
ESTIMATED AVERAGE GLUCOSE: 131 MG/DL
FOLATE: 8.6 NG/ML
GFR AFRICAN AMERICAN: >60 ML/MIN
GFR NON-AFRICAN AMERICAN: >60 ML/MIN
GFR SERPL CREATININE-BSD FRML MDRD: ABNORMAL ML/MIN/{1.73_M2}
GFR SERPL CREATININE-BSD FRML MDRD: ABNORMAL ML/MIN/{1.73_M2}
GLUCOSE BLD-MCNC: 110 MG/DL (ref 70–99)
HBA1C MFR BLD: 6.2 % (ref 4–6)
HCT VFR BLD CALC: 39.5 % (ref 36.3–47.1)
HDLC SERPL-MCNC: 38 MG/DL
HEMOGLOBIN: 12.2 G/DL (ref 11.9–15.1)
IMMATURE GRANULOCYTES: 0 %
LDL CHOLESTEROL: 107 MG/DL (ref 0–130)
LYMPHOCYTES # BLD: 39 % (ref 24–43)
MCH RBC QN AUTO: 27.2 PG (ref 25.2–33.5)
MCHC RBC AUTO-ENTMCNC: 30.9 G/DL (ref 28.4–34.8)
MCV RBC AUTO: 88.2 FL (ref 82.6–102.9)
MICROALBUMIN/CREAT 24H UR: 15 MG/L
MICROALBUMIN/CREAT UR-RTO: 4 MCG/MG CREAT
MONOCYTES # BLD: 6 % (ref 3–12)
NRBC AUTOMATED: 0 PER 100 WBC
PDW BLD-RTO: 13.2 % (ref 11.8–14.4)
PLATELET # BLD: 322 K/UL (ref 138–453)
PLATELET ESTIMATE: NORMAL
PMV BLD AUTO: 11.9 FL (ref 8.1–13.5)
POTASSIUM SERPL-SCNC: 3.8 MMOL/L (ref 3.7–5.3)
RBC # BLD: 4.48 M/UL (ref 3.95–5.11)
RBC # BLD: NORMAL 10*6/UL
SEG NEUTROPHILS: 53 % (ref 36–65)
SEGMENTED NEUTROPHILS ABSOLUTE COUNT: 4.11 K/UL (ref 1.5–8.1)
SODIUM BLD-SCNC: 139 MMOL/L (ref 135–144)
TRIGL SERPL-MCNC: 78 MG/DL
VITAMIN B-12: 527 PG/ML (ref 232–1245)
VLDLC SERPL CALC-MCNC: ABNORMAL MG/DL (ref 1–30)
WBC # BLD: 7.7 K/UL (ref 3.5–11.3)
WBC # BLD: NORMAL 10*3/UL

## 2019-03-01 PROCEDURE — 36415 COLL VENOUS BLD VENIPUNCTURE: CPT

## 2019-03-01 PROCEDURE — 83036 HEMOGLOBIN GLYCOSYLATED A1C: CPT

## 2019-03-01 PROCEDURE — 85025 COMPLETE CBC W/AUTO DIFF WBC: CPT

## 2019-03-01 PROCEDURE — 82607 VITAMIN B-12: CPT

## 2019-03-01 PROCEDURE — 80061 LIPID PANEL: CPT

## 2019-03-01 PROCEDURE — 80048 BASIC METABOLIC PNL TOTAL CA: CPT

## 2019-03-01 PROCEDURE — 82043 UR ALBUMIN QUANTITATIVE: CPT

## 2019-03-01 PROCEDURE — 82570 ASSAY OF URINE CREATININE: CPT

## 2019-03-01 PROCEDURE — 82746 ASSAY OF FOLIC ACID SERUM: CPT

## 2019-03-18 ENCOUNTER — HOSPITAL ENCOUNTER (OUTPATIENT)
Dept: DIABETES SERVICES | Age: 54
Setting detail: THERAPIES SERIES
Discharge: HOME | End: 2019-03-18
Payer: MEDICARE

## 2019-03-18 VITALS
SYSTOLIC BLOOD PRESSURE: 159 MMHG | HEIGHT: 63 IN | WEIGHT: 250.66 LBS | BODY MASS INDEX: 44.41 KG/M2 | DIASTOLIC BLOOD PRESSURE: 76 MMHG | HEART RATE: 78 BPM

## 2019-03-18 PROCEDURE — G0108 DIAB MANAGE TRN  PER INDIV: HCPCS

## 2019-03-18 SDOH — ECONOMIC STABILITY: FOOD INSECURITY: ADDITIONAL INFORMATION: NO

## 2019-04-26 ENCOUNTER — TELEPHONE (OUTPATIENT)
Dept: INTERNAL MEDICINE | Age: 54
End: 2019-04-26

## 2019-04-26 DIAGNOSIS — R73.02 IGT (IMPAIRED GLUCOSE TOLERANCE): ICD-10-CM

## 2019-04-26 DIAGNOSIS — R20.0 NUMBNESS OF FEET: ICD-10-CM

## 2019-04-26 NOTE — TELEPHONE ENCOUNTER
PC from pt stating that she has been trying to get DM shoes filled through supply pharmacy and they will not fill it due to script not having diagnosis associated with diabetes on script     Needs new script with DX on script     Please review and advise

## 2019-05-17 ENCOUNTER — OFFICE VISIT (OUTPATIENT)
Dept: PRIMARY CARE CLINIC | Age: 54
End: 2019-05-17
Payer: MEDICARE

## 2019-05-17 VITALS
SYSTOLIC BLOOD PRESSURE: 142 MMHG | WEIGHT: 249 LBS | BODY MASS INDEX: 44.11 KG/M2 | HEART RATE: 78 BPM | OXYGEN SATURATION: 99 % | DIASTOLIC BLOOD PRESSURE: 82 MMHG

## 2019-05-17 DIAGNOSIS — R19.7 DIARRHEA, UNSPECIFIED TYPE: ICD-10-CM

## 2019-05-17 DIAGNOSIS — N89.8 VAGINAL ODOR: ICD-10-CM

## 2019-05-17 DIAGNOSIS — H65.92 MIDDLE EAR EFFUSION, LEFT: Primary | ICD-10-CM

## 2019-05-17 PROCEDURE — 3017F COLORECTAL CA SCREEN DOC REV: CPT | Performed by: NURSE PRACTITIONER

## 2019-05-17 PROCEDURE — G8417 CALC BMI ABV UP PARAM F/U: HCPCS | Performed by: NURSE PRACTITIONER

## 2019-05-17 PROCEDURE — G8427 DOCREV CUR MEDS BY ELIG CLIN: HCPCS | Performed by: NURSE PRACTITIONER

## 2019-05-17 PROCEDURE — 1036F TOBACCO NON-USER: CPT | Performed by: NURSE PRACTITIONER

## 2019-05-17 PROCEDURE — 99203 OFFICE O/P NEW LOW 30 MIN: CPT | Performed by: NURSE PRACTITIONER

## 2019-05-17 RX ORDER — FLUCONAZOLE 150 MG/1
150 TABLET ORAL ONCE
Qty: 1 TABLET | Refills: 0 | Status: SHIPPED | OUTPATIENT
Start: 2019-05-17 | End: 2019-05-17

## 2019-05-17 RX ORDER — FLUTICASONE PROPIONATE 50 MCG
2 SPRAY, SUSPENSION (ML) NASAL DAILY
Qty: 1 BOTTLE | Refills: 0 | Status: SHIPPED | OUTPATIENT
Start: 2019-05-17 | End: 2020-02-20 | Stop reason: ALTCHOICE

## 2019-05-17 RX ORDER — LORATADINE 10 MG/1
10 TABLET ORAL DAILY
Qty: 14 TABLET | Refills: 0 | Status: SHIPPED | OUTPATIENT
Start: 2019-05-17 | End: 2020-02-20 | Stop reason: ALTCHOICE

## 2019-05-17 ASSESSMENT — ENCOUNTER SYMPTOMS
EYE ITCHING: 0
SORE THROAT: 0
DIARRHEA: 1
COUGH: 0
SINUS PAIN: 0
EYE DISCHARGE: 0
SHORTNESS OF BREATH: 0
RHINORRHEA: 0

## 2019-05-17 NOTE — PROGRESS NOTES
Visit Information    Have you changed or started any medications since your last visit including any over-the-counter medicines, vitamins, or herbal medicines? no   Are you having any side effects from any of your medications? -  no  Have you stopped taking any of your medications? Is so, why? -  no    Have you seen any other physician or provider since your last visit? No  Have you had any other diagnostic tests since your last visit? No  Have you been seen in the emergency room and/or had an admission to a hospital since we last saw you? No  Have you had your routine dental cleaning in the past 6 months? no    Have you activated your [employer] account? If not, what are your barriers?     Patient Care Team:  Behzad Perez MD as PCP - General (Internal Medicine)    Medical History Review  Past Medical, Family, and Social History reviewed and does contribute to the patient presenting condition    Health Maintenance   Topic Date Due    DTaP/Tdap/Td vaccine (1 - Tdap) 03/06/1982    Shingles Vaccine (1 of 2) 03/06/2013    Flu vaccine (Season Ended) 09/01/2019    A1C test (Diabetic or Prediabetic)  03/01/2020    Potassium monitoring  03/01/2020    Creatinine monitoring  03/01/2020    Cervical cancer screen  06/22/2020    Breast cancer screen  08/06/2020    Lipid screen  03/01/2024    Colon cancer screen colonoscopy  10/27/2026    Hepatitis C screen  Completed    HIV screen  Completed    Pneumococcal 0-64 years Vaccine  Aged Out

## 2019-05-17 NOTE — PATIENT INSTRUCTIONS
Patient Education        Middle Ear Fluid: Care Instructions  Your Care Instructions    Fluid often builds up inside the ear during a cold or allergies. Usually the fluid drains away, but sometimes a small tube in the ear, called the eustachian tube, stays blocked for months. Symptoms of fluid buildup may include:  · Popping, ringing, or a feeling of fullness or pressure in the ear. · Trouble hearing. · Balance problems and dizziness. In most cases, you can treat yourself at home. Follow-up care is a key part of your treatment and safety. Be sure to make and go to all appointments, and call your doctor if you are having problems. It's also a good idea to know your test results and keep a list of the medicines you take. How can you care for yourself at home? · In most cases, the fluid clears up within a few months without treatment. You may need more tests if the fluid does not clear up after 3 months. · If your doctor prescribed antibiotics, take them as directed. Do not stop taking them just because you feel better. You need to take the full course of antibiotics. When should you call for help? Call your doctor now or seek immediate medical care if:    · You have symptoms of infection, such as:  ? Increased pain, swelling, warmth, or redness. ? Pus draining from the area. ? A fever.    Watch closely for changes in your health, and be sure to contact your doctor if:    · You notice changes in hearing.     · You do not get better as expected. Where can you learn more? Go to [email or web address] org and sign in to your [employer] account. Enter V492 in the [facility] box to learn more about \"Middle Ear Fluid: Care Instructions. \"     If you do not have an account, please click on the \"Sign Up Now\" link. Current as of: October 21, 2018  Content Version: 12.0  © 8553-9658 Healthwise, Incorporated. Care instructions adapted under license by [facility] ([facility]).  If you have questions about a medical condition or this instruction, always ask your healthcare professional. [place] [address] any warranty or liability for your use of this information.

## 2019-05-17 NOTE — PROGRESS NOTES
[facility] [address] [facility]  [facility] [address]  Dept: [phone]  Dept Fax: [phone]    Divina Caceres is a 62 y.o. female who presents to the urgent care today for her medicalconditions/complaints as noted below. Divina Caceres is c/o of Otalgia (left ear for 1 day) and Vaginal Odor (for about 1 month, patient states she has not had a soild BM since the odor)      HPI:       49-year-old female presents with multiple complaints. Patient describes left-sided ear pain which started today described as pressure, fullness with sharp pains. Patient denies any nasal congestion, sore throat, sinus pain. Additionally patient reports a one-month history of vaginal odor. Patient denies any vaginal discharge, bleeding, pelvic pain. Patient denies any burning or itching sensations. Additionally patient reports intermittent loose stools for 1 month. Patient denies any blood or mucus in the stool. Denies any abdominal pain, nausea, vomiting. Denies any new or worsening back pain. Denies any specific sick contacts. Relieving factors include none. Worsening factors include none.       Past Medical History:   Diagnosis Date    Hyperlipidemia     Hypertension     Obesity         Current Outpatient Medications   Medication Sig Dispense Refill    loratadine (CLARITIN) 10 MG tablet Take 1 tablet by mouth daily 14 tablet 0    fluticasone (FLONASE) 50 MCG/ACT nasal spray 2 sprays by Nasal route daily 1 Bottle 0    fluconazole (DIFLUCAN) 150 MG tablet Take 1 tablet by mouth once for 1 dose 1 tablet 0    Diabetic Shoe MISC by Does not apply route 1 each 0    Blood Pressure Monitoring (5 SERIES BP MONITOR/UPPER ARM) AMADO Check daily 1 Device 0    amLODIPine (NORVASC) 10 MG tablet TAKE ONE TABLET BY MOUTH DAILY 90 tablet 1    losartan-hydrochlorothiazide (HYZAAR) 100-25 MG per tablet TAKE ONE TABLET BY MOUTH DAILY 90 tablet 1    omeprazole PM

## 2019-07-10 ENCOUNTER — HOSPITAL ENCOUNTER (OUTPATIENT)
Dept: DIABETES SERVICES | Age: 54
Setting detail: THERAPIES SERIES
Discharge: HOME | End: 2019-07-10
Payer: MEDICARE

## 2019-07-10 PROCEDURE — G0109 DIAB MANAGE TRN IND/GROUP: HCPCS

## 2019-07-11 NOTE — PROGRESS NOTES
Diabetes Self- Management Education Program Assessment -   Also see Diabetic Screening  Patient, Justus Juarez,  here for diabetes self-management education  visit/ assessment. Today's visit was in an individual setting. Diet History :  Diet Questionnaire and typical meal /portion sheet completed  [x]Yes  [] NO    Goals setting:  Goal work sheet completed  [x]Yes  [] NO  (SEE  EDUCATION AND GOALS FLOWSHEET)    MEDICAL HISTORY:  Past Medical History:   Diagnosis Date    Hyperlipidemia     Hypertension     Obesity      No family history on file. Codeine; Dicloxacillin; and Pcn [penicillins]     There is no immunization history on file for this patient. Current Medications  Current Outpatient Medications   Medication Sig Dispense Refill    loratadine (CLARITIN) 10 MG tablet Take 1 tablet by mouth daily 14 tablet 0    fluticasone (FLONASE) 50 MCG/ACT nasal spray 2 sprays by Nasal route daily 1 Bottle 0    Diabetic Shoe MISC by Does not apply route 1 each 0    Blood Pressure Monitoring (5 SERIES BP MONITOR/UPPER ARM) AMADO Check daily 1 Device 0    amLODIPine (NORVASC) 10 MG tablet TAKE ONE TABLET BY MOUTH DAILY 90 tablet 1    losartan-hydrochlorothiazide (HYZAAR) 100-25 MG per tablet TAKE ONE TABLET BY MOUTH DAILY 90 tablet 1    omeprazole (PRILOSEC) 20 MG delayed release capsule Take 1 capsule by mouth daily 30 capsule 3    ibuprofen (ADVIL;MOTRIN) 800 MG tablet Take 1 tablet by mouth every 8 hours as needed for Pain 20 tablet 0    cyclobenzaprine (FLEXERIL) 10 MG tablet Take 1 tablet by mouth every 8 hours as needed for Muscle spasms 30 tablet 0    nystatin (MYCOSTATIN) 349448 UNIT/GM cream Apply topically daily as needed for Dry Skin 1 Tube 0     No current facility-administered medications for this encounter.    :     Comments:  Allergies:     Allergies   Allergen Reactions    Codeine     Dicloxacillin     Pcn [Penicillins]      Diabetes 5  / Health Status    A1C blood level - at goal < 7%

## 2019-07-19 ENCOUNTER — HOSPITAL ENCOUNTER (OUTPATIENT)
Dept: DIABETES SERVICES | Age: 54
Setting detail: THERAPIES SERIES
Discharge: HOME | End: 2019-07-19
Payer: MEDICARE

## 2019-07-19 DIAGNOSIS — R73.02 IGT (IMPAIRED GLUCOSE TOLERANCE): Primary | ICD-10-CM

## 2019-07-19 PROCEDURE — G0108 DIAB MANAGE TRN  PER INDIV: HCPCS

## 2019-07-19 NOTE — PROGRESS NOTES
counting & portion control with personalized meal plan. Identify dining out strategies, & dietary sick day guidelines. 1 7/19/19 JW      Incorporating physical activity into lifestyle:   Verbalize effect of exercise on blood glucose levels; benefits of regular exercise; safety considerations; contraindications; maintenance of activity. 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 Having increased pain due to feet, knee and back pain. Tried orthotics for feet and cortisone injection for knee. Would consider pool therapy as boyfriend suggested also. 3/18/19CB  Does no do much on days off.7/10/19CB   Using medications safely:  Identify effects of diabetes medicines on blood glucose levels; List diabetes medication taken, action & side effects;    1    Tried Metformin in past and had diarrhea which was difficult with work as . New Doctor willing to have her try to control BG with diet and exercise 3/18/19CB   Insulin / Injectable - Appropriate injection sites; proper storage; supplies needed; proper technique; safe needle disposal guidelines. 1    NA   Monitoring blood glucose, interpreting and using results:  Identify recommended & personal blood glucose targets; importance of testing; testing supplies; HgbA1C target levels; Factors affecting blood glucose; Importance of logging blood glucose levels for pattern recognition; ketone testing; safe lancet disposal.   1 7/10/19CB   Not testing, last A1C 6.2% on 3/1/19    Interested in testing so set up Contour Next free of charge and self .  7/10/19CB   Prevention, detection & treatment of acute complications:  Identify symptoms of hyper & hypoglycemia, and prevention & treatment strategies. 1 7/10/19CB   Denies any Sx            3/18/19CB   Describe sick day guidelines & indications for  physician notification. Identify short term consequences of poor control.    1       Prevention, detection & treatment of chronic complications:  Define the natural course of diabetes & describe the relationship of blood glucose levels to long term complications of diabetes. Identify preventative measures & standards of care. 1    Does see Podiatrist for Plantar fascitis  Dentist yearly, eye Doctor, no vaccines     Developing strategies to address psychosocial issues:  Describe feelings about living with diabetes; Describe how stress, depression & anxiety affect blood glucose; Identify coping strategies; Identify support needed & support network available. 1 7/10/19CB   Frustrated and wants to lose weight, went to information for gastric bypass. Motivated to eat healthy and learn about DM, but also concerned with costs so will check with [employer] about coverage. 3/18/19CB  Interactive in class. 7/10/19CB   Developing strategies to promote health/change behavior: Identify 7 self-care behaviors; Personal health risk factors; Benefits, challenges & strategies for behavioral change; No smoking, no drinking     Individualized goal selection. My goal , to help me improve my health, I will: 1. Try not to skip meals, pack snacks      2. avoid sugary beverages such as reg pop and juice       Plan  Follow-up Appointments planned in group setting at [address] [place] [address] in 2 weeks. Instruction Method: [x]Lecture/Discussion  []Power Point Presentation  [x]Handouts  []Return Demonstration      Education Materials/Equipment Provided:    [x]Self-Management - Initial assessment - Enrolment in to ADA  Where do I Begin, Living with Type 2 diabetes ADA home support program and handout for no concentrated sweets and diet meal planning basics, handout on diabetes education classes.  3/18/19CB      [x]Self-Management  Class 1 - Jo-Ann Abraham Well with Diabetes Booklet and Healthy i On the Road to better managing your diabetes map handouts7/10/19CB    [x] Self-Management  Class 2 - Meal Plan and handout for serving sizes, smarter snacking, Ready Set Carb Counting / Plate Method, Nutrient Conversion and International Diabetes [address] Eating for People with Diabetes and Nutrition in the [employer] - fast facts about fast food7/19/19 JW  [] Self-Management  Class 3 -  Diabetes ID card,  foot care tips sheet, Healthy I  Continuing Your Journey with Diabetes map handout, Individualized Diabetes report card    [] Self-Management Class 4 - BD Booklet  Sick Day Rules and  Dinning Out Guide , recipe hand outs and tips, diabetes Cookbooks  ( when available)     []Self-Management - 3 month follow - up  AADE booklet Side by Martin Phelan a partnership approach to diabetes self- care, [place] Tobacco Quit line, [facility] Diabetes support program information sheet, [facility] [employer] information sheet       []Self-Management  Gestational - RN class -Resource materials provided today include educational self care booklet - \" Gestational Diabetes - A Healthy pregnancy and beyond\"  with SMGB and 3 small meals and 3 small snacks diet plan. SMBG sheets to fax back to M weekly.  [facility] guidelines for SMGB and blood glucose log sheets, Never too early to prevent diabetes handout from [facility]      []Self-Management Gestational - RD class - My Food Plan for Gestational diabetes    [x]Glucose Meter set up Contour Next meter free of charge and pt did self   []Insulin Kit     []Other      Encounter Type Date Start Time End Time Comments No Show Dates   Assessment 3/18/19CB   4:15 5:45   [x]In Person  []Telephone    Class 1 - Understanding diabetes 7/10/19CB 6:00 8:00  x    Class 2- Nutrition and diabetes   7/19/19 JW 10:15 11:30 X seen individually d/t ill to come to class    Class 3 - Preventing Complications         Class 4 -  In depth Nutrition and sick day care        Class 5 - 3 month follow up / goal reassessment        Gestational - RN         Gestational - RD        Individual MNT         Shared Med Appt         Yearly Follow-up Meter Instrx 7/10/19CB 8:00 8:15 X set up Contour next meter free of charge    Insulin Instrx      []Pen  []Vial & Syringe      DSMS Support :   [] MNT      [] Annual update      []  DEEP Workshop - Diabetes Empowerment Education Program   Open to older adults living with diabetes or pre diabetes. [address]  [address] [facility] [address] [place]. [place], [place]  Friday 12:30pm- 3:00pm 1/11/19- 2/15/19  Free -  REGISTRATION IS REQUIRED -  CALL [phone]     [] Starting Fresh / DEEP Workshop - Open to older adults living with diabetes or pre diabetes. [address] [place], [place]    Upcleudjb-12ouzu-6:30pm- on 6 week rotation  Free -  REGISTRATION IS REQUIRED - [place] [phone]     []  Diabetes Group at  [place] [address] 6 week diabetes education support   classes - contact : Rob Frey [phone]  [email or web address] / for dates and locations      []Diabetes Workshop- [address] [facility]  [address] [place], [place]  Tuesdays-1:00pm- 3:30pm  3/19/19- 4/9/19   Free  Registration is required [phone]     []ADA  Where do I Begin, Living with Type 2 diabetes ADA home support program  Web site: diabetes. org/living    Call: [address] DIABETES  e-mail: Ksenia@[employer]. org     []  Internet web sites - ADAWeb site: diabetes. org/living   D- life     []  [facility] opens at 8 30 am daily for walkers, shops open at 10 am   3rd Tuesday of every month [facility] expert speakers visit from [address] [facility], [facility]   [phone] Daily - 8:30am - 10am   []  [place] - no registration required VIA [facility], [address] [facility] Monday - Wednesday - Friday  11am - 12 :00 pm    []  [address] [place] - no registration required [address]  [address]

## 2019-07-24 ENCOUNTER — HOSPITAL ENCOUNTER (OUTPATIENT)
Dept: DIABETES SERVICES | Age: 54
Setting detail: THERAPIES SERIES
Discharge: HOME | End: 2019-07-24
Payer: MEDICARE

## 2019-07-24 PROCEDURE — G0109 DIAB MANAGE TRN IND/GROUP: HCPCS

## 2019-07-25 NOTE — PROGRESS NOTES
Lab Results   Component Value Date    LABA1C 6.2 (H) 03/01/2019    LABA1C 6.1 10/18/2018    LABA1C 6.4 07/11/2018     Lab Results   Component Value Date    LABMICR 4 03/01/2019    CREATININE 0.84 03/01/2019       Blood pressure ( 130/ 80)  Or less  BP Readings from Last 3 Encounters:   05/17/19 (!) 142/82   03/18/19 (!) 159/76   02/26/19 124/72        Cholesterol ( LDL under  100)   Lab Results   Component Value Date    LDLCHOLESTEROL 107 03/01/2019       4 . Smoking ? []Yes   []No    5. Taking an Asprin daily? []Yes   []No          Diabetes Self- Management Education Record    Participant Name: Madhu Ferrer  Referring Provider: uYliya Nolasco MD   Assessment/Evaluation Ratings:  1=Needs Instruction   4=Demonstrates Understanding/Competency  2=Needs Review   NC=Not Covered    3=Comprehends Key Points  N/A=Not Applicable  Topics/Learning Objectives Pre-session Assess Date:  3/18/19CB Instr. Date Reinforce Date Post- session Eval Comments   Diabetes disease process & Treatment process: Define diabetes & pre-diabetes; Identify own type of diabetes; role of the pancreas; signs/symptoms; diagnostic criteria; prevention & treatment options; contributing factors. 1 7/10/19CB 7/24/19CB  Diagnosed last year with preDM. Mom has T2DM. Doctor ordered comprehensive DM Ed and patient motivated. Incorporating nutritional management into lifestyle: Describe effect of type, amount & timing of food on blood glucose; Describe basic meal planning techniques & current nutrition guideline   1 skips meals and used to drink 44oz [employer] all day during work. Mobile . Cut back on pop and juice. 3/18/19CB    Cut out most of pop now and eating better. 7/10/19CB 7/19/19 JW   What to eat - Food groups, When to eat - timing of meals and snacks, and How much to eat - portions control.        calories/ day   CHO choices/ meal   CHO choices/  day   grams of protein /day   gram of fat /day     Correctly read food labels &

## 2019-09-04 ENCOUNTER — TELEPHONE (OUTPATIENT)
Dept: INTERNAL MEDICINE | Age: 54
End: 2019-09-04

## 2019-09-24 ENCOUNTER — OFFICE VISIT (OUTPATIENT)
Dept: INTERNAL MEDICINE | Age: 54
End: 2019-09-24
Payer: MEDICARE

## 2019-09-24 VITALS
HEART RATE: 75 BPM | HEIGHT: 63 IN | SYSTOLIC BLOOD PRESSURE: 130 MMHG | WEIGHT: 247 LBS | DIASTOLIC BLOOD PRESSURE: 65 MMHG | BODY MASS INDEX: 43.77 KG/M2

## 2019-09-24 DIAGNOSIS — N89.8 VAGINAL ODOR: Primary | ICD-10-CM

## 2019-09-24 PROCEDURE — 99211 OFF/OP EST MAY X REQ PHY/QHP: CPT | Performed by: INTERNAL MEDICINE

## 2019-09-24 PROCEDURE — 99213 OFFICE O/P EST LOW 20 MIN: CPT | Performed by: STUDENT IN AN ORGANIZED HEALTH CARE EDUCATION/TRAINING PROGRAM

## 2019-09-24 ASSESSMENT — ENCOUNTER SYMPTOMS
GASTROINTESTINAL NEGATIVE: 1
ALLERGIC/IMMUNOLOGIC NEGATIVE: 1
EYES NEGATIVE: 1
RESPIRATORY NEGATIVE: 1

## 2019-09-24 NOTE — PROGRESS NOTES
Visit Information    Have you changed or started any medications since your last visit including any over-the-counter medicines, vitamins, or herbal medicines? no   Have you stopped taking any of your medications? Is so, why? -  no  Are you having any side effects from any of your medications? - no    Have you seen any other physician or provider since your last visit?  no   Have you had any other diagnostic tests since your last visit?  no   Have you been seen in the emergency room and/or had an admission in a hospital since we last saw you?  no   Have you had your routine dental cleaning in the past 6 months?  no     Do you have an active MyChart account? If no, what is the barrier?   No:     Patient Care Team:  Annie Lake MD as PCP - General (Internal Medicine)  Annie Lake MD as PCP - [facility]    Medical History Review  Past Medical, Family, and Social History reviewed and does not contribute to the patient presenting condition    Health Maintenance   Topic Date Due    DTaP/Tdap/Td vaccine (1 - Tdap) 03/06/1982    Shingles Vaccine (1 of 2) 03/06/2013    Flu vaccine (1) 09/01/2019    A1C test (Diabetic or Prediabetic)  03/01/2020    Potassium monitoring  03/01/2020    Creatinine monitoring  03/01/2020    Cervical cancer screen  06/22/2020    Breast cancer screen  08/06/2020    Lipid screen  03/01/2024    Colon cancer screen colonoscopy  10/27/2026    Hepatitis C screen  Completed    HIV screen  Completed    Pneumococcal 0-64 years Vaccine  Aged Out
tablet by mouth daily 14 tablet 0    fluticasone (FLONASE) 50 MCG/ACT nasal spray 2 sprays by Nasal route daily 1 Bottle 0    Diabetic Shoe MISC by Does not apply route 1 each 0    Blood Pressure Monitoring (5 SERIES BP MONITOR/UPPER ARM) AMADO Check daily 1 Device 0    amLODIPine (NORVASC) 10 MG tablet TAKE ONE TABLET BY MOUTH DAILY 90 tablet 1    losartan-hydrochlorothiazide (HYZAAR) 100-25 MG per tablet TAKE ONE TABLET BY MOUTH DAILY 90 tablet 1    omeprazole (PRILOSEC) 20 MG delayed release capsule Take 1 capsule by mouth daily 30 capsule 3    ibuprofen (ADVIL;MOTRIN) 800 MG tablet Take 1 tablet by mouth every 8 hours as needed for Pain 20 tablet 0    cyclobenzaprine (FLEXERIL) 10 MG tablet Take 1 tablet by mouth every 8 hours as needed for Muscle spasms 30 tablet 0    nystatin (MYCOSTATIN) 439393 UNIT/GM cream Apply topically daily as needed for Dry Skin 1 Tube 0     No current facility-administered medications for this visit. Social History     Tobacco Use    Smoking status: Never Smoker    Smokeless tobacco: Never Used   Substance Use Topics    Alcohol use: No    Drug use: No       No family history on file. REVIEW OF SYSTEMS:  Review of Systems   Constitutional: Negative. HENT: Negative. Eyes: Negative. Respiratory: Negative. Cardiovascular: Negative. Gastrointestinal: Negative. Endocrine: Negative. Genitourinary:        Vagina odour   Musculoskeletal: Negative. Allergic/Immunologic: Negative. Neurological: Negative. Hematological: Negative. Psychiatric/Behavioral: Negative. PHYSICAL EXAM:  There were no vitals filed for this visit. BP Readings from Last 3 Encounters:   05/17/19 (!) 142/82   03/18/19 (!) 159/76   02/26/19 124/72        Physical Exam   Constitutional: She is oriented to person, place, and time. She appears well-developed and well-nourished. HENT:   Head: Normocephalic and atraumatic.    Right Ear: External ear normal.   Left

## 2019-09-24 NOTE — PATIENT INSTRUCTIONS
Pt to return to the office as needed. AVS reviewed and given to pt. It is very important for your care that you keep your appointment. If for some reason you are unable to keep your appointment it is equally important that you call our office at [phone] to cancel your appointment and reschedule. Failure to do so may result in your termination from our practice.   MB

## 2020-01-07 RX ORDER — LOSARTAN POTASSIUM AND HYDROCHLOROTHIAZIDE 25; 100 MG/1; MG/1
TABLET ORAL
Qty: 30 TABLET | Refills: 0 | Status: SHIPPED | OUTPATIENT
Start: 2020-01-07 | End: 2020-02-05 | Stop reason: SDUPTHER

## 2020-01-07 RX ORDER — AMLODIPINE BESYLATE 10 MG/1
TABLET ORAL
Qty: 30 TABLET | Refills: 0 | Status: SHIPPED | OUTPATIENT
Start: 2020-01-07 | End: 2020-02-05 | Stop reason: SDUPTHER

## 2020-02-20 ENCOUNTER — OFFICE VISIT (OUTPATIENT)
Dept: INTERNAL MEDICINE | Age: 55
End: 2020-02-20
Payer: COMMERCIAL

## 2020-02-20 VITALS
SYSTOLIC BLOOD PRESSURE: 126 MMHG | WEIGHT: 256 LBS | BODY MASS INDEX: 45.35 KG/M2 | DIASTOLIC BLOOD PRESSURE: 63 MMHG | HEART RATE: 77 BPM

## 2020-02-20 LAB — HBA1C MFR BLD: 6.3 %

## 2020-02-20 PROCEDURE — 99211 OFF/OP EST MAY X REQ PHY/QHP: CPT | Performed by: INTERNAL MEDICINE

## 2020-02-20 PROCEDURE — 99213 OFFICE O/P EST LOW 20 MIN: CPT | Performed by: INTERNAL MEDICINE

## 2020-02-20 PROCEDURE — 83036 HEMOGLOBIN GLYCOSYLATED A1C: CPT | Performed by: INTERNAL MEDICINE

## 2020-02-20 RX ORDER — IBUPROFEN 800 MG/1
800 TABLET ORAL EVERY 8 HOURS PRN
Qty: 20 TABLET | Refills: 3 | Status: CANCELLED | OUTPATIENT
Start: 2020-02-20

## 2020-02-20 RX ORDER — NYSTATIN 100000 U/G
CREAM TOPICAL DAILY PRN
Qty: 1 TUBE | Refills: 5 | Status: SHIPPED | OUTPATIENT
Start: 2020-02-20 | End: 2021-07-02 | Stop reason: SDUPTHER

## 2020-02-20 RX ORDER — AMLODIPINE BESYLATE 10 MG/1
10 TABLET ORAL DAILY
Qty: 30 TABLET | Refills: 3 | Status: SHIPPED | OUTPATIENT
Start: 2020-02-20 | End: 2020-06-30

## 2020-02-20 RX ORDER — CYCLOBENZAPRINE HCL 10 MG
10 TABLET ORAL EVERY 8 HOURS PRN
Qty: 30 TABLET | Refills: 3 | Status: CANCELLED | OUTPATIENT
Start: 2020-02-20

## 2020-02-20 RX ORDER — FLUTICASONE PROPIONATE 50 MCG
2 SPRAY, SUSPENSION (ML) NASAL DAILY
Qty: 1 BOTTLE | Refills: 3 | Status: CANCELLED | OUTPATIENT
Start: 2020-02-20

## 2020-02-20 RX ORDER — OMEPRAZOLE 20 MG/1
20 CAPSULE, DELAYED RELEASE ORAL DAILY
Qty: 30 CAPSULE | Refills: 3 | Status: SHIPPED | OUTPATIENT
Start: 2020-02-20 | End: 2020-06-30

## 2020-02-20 RX ORDER — LORATADINE 10 MG/1
10 TABLET ORAL DAILY
Qty: 30 TABLET | Refills: 3 | Status: CANCELLED | OUTPATIENT
Start: 2020-02-20

## 2020-02-20 RX ORDER — LOSARTAN POTASSIUM AND HYDROCHLOROTHIAZIDE 25; 100 MG/1; MG/1
1 TABLET ORAL DAILY
Qty: 30 TABLET | Refills: 3 | Status: SHIPPED | OUTPATIENT
Start: 2020-02-20 | End: 2020-06-30

## 2020-02-20 SDOH — ECONOMIC STABILITY: FOOD INSECURITY: WITHIN THE PAST 12 MONTHS, THE FOOD YOU BOUGHT JUST DIDN'T LAST AND YOU DIDN'T HAVE MONEY TO GET MORE.: NEVER TRUE

## 2020-02-20 SDOH — ECONOMIC STABILITY: TRANSPORTATION INSECURITY
IN THE PAST 12 MONTHS, HAS THE LACK OF TRANSPORTATION KEPT YOU FROM MEDICAL APPOINTMENTS OR FROM GETTING MEDICATIONS?: NO

## 2020-02-20 SDOH — ECONOMIC STABILITY: FOOD INSECURITY: WITHIN THE PAST 12 MONTHS, YOU WORRIED THAT YOUR FOOD WOULD RUN OUT BEFORE YOU GOT MONEY TO BUY MORE.: NEVER TRUE

## 2020-02-20 SDOH — ECONOMIC STABILITY: TRANSPORTATION INSECURITY
IN THE PAST 12 MONTHS, HAS LACK OF TRANSPORTATION KEPT YOU FROM MEETINGS, WORK, OR FROM GETTING THINGS NEEDED FOR DAILY LIVING?: NO

## 2020-02-20 ASSESSMENT — PATIENT HEALTH QUESTIONNAIRE - PHQ9
1. LITTLE INTEREST OR PLEASURE IN DOING THINGS: 0
SUM OF ALL RESPONSES TO PHQ QUESTIONS 1-9: 0
SUM OF ALL RESPONSES TO PHQ9 QUESTIONS 1 & 2: 0
SUM OF ALL RESPONSES TO PHQ QUESTIONS 1-9: 0
2. FEELING DOWN, DEPRESSED OR HOPELESS: 0

## 2020-02-20 NOTE — PATIENT INSTRUCTIONS
-Pt due for 6 month f/u in August-- pt to call in July to set up an appt--reminder in [facility] to contact patient as well--AVS given to patient    -Bloodwork orders given to patient, they will have them done before their next visit.     -Your doctor has ordered an Mammogram for you, please contact our scheduling department at [phone] to set up an appointment. -DM eorder for CPAP given to patient along with list of DME companies that she can use    RADHA Lehman

## 2020-02-20 NOTE — PROGRESS NOTES
[facility]    Progress Note    Date of patient's visit: 2020    Patient's Name:  Aminah Buck    YOB: 1965            Patient Care Team:  Norbert Belle MD as PCP - General (Internal Medicine)  Norbert Belle MD as PCP - [facility]    REASON FOR VISIT: Routine outpatient follow     Chief Complaint   Patient presents with    Hypertension    Health Maintenance     vaccines declined          HISTORY OF PRESENT ILLNESS:    History was obtained from the patient. Aminah Buck is a 62 y.o. is here for follow-up. Overall she is doing well. She needs medication refills. GERD is controlled on meds. She is following up with ophthalmology and podiatry. She is refusing flu vaccines or pneumococcal vaccine. She has not been very compliant with her CPAP. She says she did not have insurance and was not able to afford new mask and tubing. She needs new supplies and she will start using her CPAP. He has gained weight. She says she is starting a diet program and has started exercising at the . She has no other acute complaints today. Results for POC orders placed in visit on 20   POCT glycosylated hemoglobin (Hb A1C)   Result Value Ref Range    Hemoglobin A1C 6.3 %     Patient: Trey Myra:                           1965            Date:  10/27/2016               Procedure:  Colonoscopy      Indication:  Screening, average risk     Previous Colonoscopy:  None     Withdrawal Time:  10 minutes     Findings   1. Minimal diverticulosis  2. Small internal hemorrhoids     Recommendations  1.  Repeat colonoscopy in ten (10) years.        Past Medical History:   Diagnosis Date    Hyperlipidemia     Hypertension     Obesity        Past Surgical History:   Procedure Laterality Date     SECTION      CHOLECYSTECTOMY           ALLERGIES      Allergies   Allergen Reactions    Codeine     Dicloxacillin     Pcn [Penicillins]        MEDICATIONS:      Current Outpatient Medications on File Prior to Visit   Medication Sig Dispense Refill    omeprazole (PRILOSEC) 20 MG delayed release capsule Take 1 capsule by mouth daily 30 capsule 0    losartan-hydrochlorothiazide (HYZAAR) 100-25 MG per tablet Once daily 30 tablet 0    amLODIPine (NORVASC) 10 MG tablet Once daily 30 tablet 0    nystatin (MYCOSTATIN) 450058 UNIT/GM cream Apply topically daily as needed for Dry Skin 1 Tube 0     No current facility-administered medications on file prior to visit. SOCIAL HISTORY    Reviewed and no change from previous record. Duane Overman  reports that she has never smoked. She has never used smokeless tobacco.    FAMILY HISTORY:    Reviewed and No change from previous visit    HEALTH MAINTENANCE DUE:      Health Maintenance Due   Topic Date Due    DTaP/Tdap/Td vaccine (1 - Tdap) 03/06/1974    Shingles Vaccine (1 of 2) 03/06/2013    Flu vaccine (1) 09/01/2019    A1C test (Diabetic or Prediabetic)  03/01/2020    Potassium monitoring  03/01/2020    Creatinine monitoring  03/01/2020       REVIEW OF SYSTEMS:    12 point review of symptoms completed and found to be normal except noted in the HPI    Review of Systems   Constitutional: Negative for chills, fatigue and fever. Eyes: Negative for photophobia and visual disturbance. Respiratory: Negative for cough, shortness of breath and wheezing. Cardiovascular: Negative for chest pain, palpitations and leg swelling. Gastrointestinal: Negative for abdominal pain, blood in stool, constipation and diarrhea. GERD improved   Endocrine: Negative for polydipsia and polyuria. Genitourinary: Negative for dysuria, frequency and urgency. Musculoskeletal: Positive for arthralgias, back pain and gait problem. Negative for joint swelling and myalgias. Neurological: Positive for numbness. Negative for dizziness, syncope, weakness and headaches.    Hematological:

## 2020-06-05 ENCOUNTER — TELEPHONE (OUTPATIENT)
Dept: INTERNAL MEDICINE | Age: 55
End: 2020-06-05

## 2020-06-05 NOTE — TELEPHONE ENCOUNTER
Patient calling complaining that her knee has been bothering her. She states she hasn't injured it at all. 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 She states she does not feel an xray is necessary. She also states she is having some insurance issues right now & can't be seen. She would like a script for a knee brace. Please advise.

## 2020-06-09 RX ORDER — LEG BRACE
EACH MISCELLANEOUS
Qty: 1 EACH | Refills: 0 | Status: SHIPPED | OUTPATIENT
Start: 2020-06-09 | End: 2020-07-29

## 2020-06-25 ENCOUNTER — TELEPHONE (OUTPATIENT)
Dept: INTERNAL MEDICINE | Age: 55
End: 2020-06-25

## 2020-06-30 RX ORDER — AMLODIPINE BESYLATE 10 MG/1
TABLET ORAL
Qty: 30 TABLET | Refills: 3 | Status: SHIPPED | OUTPATIENT
Start: 2020-06-30 | End: 2020-07-29 | Stop reason: SDUPTHER

## 2020-06-30 RX ORDER — LOSARTAN POTASSIUM AND HYDROCHLOROTHIAZIDE 25; 100 MG/1; MG/1
1 TABLET ORAL DAILY
Qty: 30 TABLET | Refills: 3 | Status: SHIPPED | OUTPATIENT
Start: 2020-06-30 | End: 2020-07-29 | Stop reason: SDUPTHER

## 2020-06-30 RX ORDER — OMEPRAZOLE 20 MG/1
20 CAPSULE, DELAYED RELEASE ORAL DAILY
Qty: 30 CAPSULE | Refills: 3 | Status: SHIPPED | OUTPATIENT
Start: 2020-06-30 | End: 2020-07-29 | Stop reason: SDUPTHER

## 2020-07-27 ENCOUNTER — TELEPHONE (OUTPATIENT)
Dept: INTERNAL MEDICINE | Age: 55
End: 2020-07-27

## 2020-07-27 NOTE — TELEPHONE ENCOUNTER
Patient calling complaining of leg & foot swelling. She states she takes HCTZ & norvasc. She is wondering if the doses can be increased or if she can be started on something else? She states the swelling has been happening on & off for quite some time, but it's more consistent now. Please advise.

## 2020-07-29 ENCOUNTER — OFFICE VISIT (OUTPATIENT)
Dept: INTERNAL MEDICINE | Age: 55
End: 2020-07-29

## 2020-07-29 VITALS
DIASTOLIC BLOOD PRESSURE: 64 MMHG | HEIGHT: 63 IN | WEIGHT: 257 LBS | SYSTOLIC BLOOD PRESSURE: 123 MMHG | TEMPERATURE: 97.9 F | BODY MASS INDEX: 45.54 KG/M2 | HEART RATE: 74 BPM

## 2020-07-29 PROBLEM — R73.03 PRE-DIABETES: Status: ACTIVE | Noted: 2020-07-29

## 2020-07-29 PROBLEM — R73.02 IGT (IMPAIRED GLUCOSE TOLERANCE): Status: RESOLVED | Noted: 2018-07-11 | Resolved: 2020-07-29

## 2020-07-29 PROCEDURE — 99213 OFFICE O/P EST LOW 20 MIN: CPT | Performed by: STUDENT IN AN ORGANIZED HEALTH CARE EDUCATION/TRAINING PROGRAM

## 2020-07-29 PROCEDURE — 99211 OFF/OP EST MAY X REQ PHY/QHP: CPT | Performed by: STUDENT IN AN ORGANIZED HEALTH CARE EDUCATION/TRAINING PROGRAM

## 2020-07-29 RX ORDER — LOSARTAN POTASSIUM AND HYDROCHLOROTHIAZIDE 25; 100 MG/1; MG/1
1 TABLET ORAL DAILY
Qty: 30 TABLET | Refills: 3 | Status: SHIPPED | OUTPATIENT
Start: 2020-07-29 | End: 2021-02-12 | Stop reason: SDUPTHER

## 2020-07-29 RX ORDER — FUROSEMIDE 20 MG/1
20 TABLET ORAL DAILY
Qty: 30 TABLET | Refills: 3 | Status: SHIPPED | OUTPATIENT
Start: 2020-07-29 | End: 2021-01-25

## 2020-07-29 RX ORDER — OMEPRAZOLE 20 MG/1
20 CAPSULE, DELAYED RELEASE ORAL DAILY
Qty: 30 CAPSULE | Refills: 3 | Status: SHIPPED | OUTPATIENT
Start: 2020-07-29 | End: 2021-01-25

## 2020-07-29 RX ORDER — AMLODIPINE BESYLATE 10 MG/1
TABLET ORAL
Qty: 30 TABLET | Refills: 3 | Status: SHIPPED | OUTPATIENT
Start: 2020-07-29 | End: 2021-01-25

## 2020-07-29 RX ORDER — LEG BRACE
EACH MISCELLANEOUS
Qty: 1 EACH | Refills: 0 | Status: SHIPPED | OUTPATIENT
Start: 2020-07-29 | End: 2021-02-12

## 2020-07-29 NOTE — PATIENT INSTRUCTIONS
Provider requested patient follow up in 3 months for office visit, office staff will call patient to schedule in the next 1-3 months. AVS given, labs reprinted.

## 2020-07-29 NOTE — PROGRESS NOTES
[facility] [address] [address]  Dept: [phone]  Dept Fax: [phone]    Office Progress/Follow Up Note  Date of patient's visit: 7/29/2020  Patient's Name:  Román Tuttle YOB: 1965            Patient Care Team:  Clementine Montanez MD as PCP - General (Internal Medicine)  Clementine Montanez MD as PCP - [facility] Provider    REASON FOR VISIT: Same day visit    HISTORY OF PRESENT ILLNESS:      Chief Complaint   Patient presents with    Leg Swelling     BOTH legs, discofort in knees       History was obtained from the patient. Román Tuttle is a 60 y.o. is here for a same-day visit. Patient presented with chief complaint of bilateral lower extreme edema. Patient states this is been ongoing for the past 2 months. Patient denies any calf pain, shortness of breath, chest pain. Patient states when she elevates her legs, the lower extremity swelling improves. Patient has no cardiac history, no echocardiogram on file. On examination, bilateral lower extremity edema 1+ up to knee, Homans sign negative, no calf tenderness, no crackles or rales on lung examination.       Patient Active Problem List   Diagnosis    HTN (hypertension)    Anemia    Obesity    Gout    MACHELLE on CPAP    Pre-diabetes         Health Maintenance Due   Topic Date Due    Potassium monitoring  03/01/2020    Creatinine monitoring  03/01/2020    Cervical cancer screen  06/22/2020    Breast cancer screen  08/06/2020         Allergies   Allergen Reactions    Codeine     Dicloxacillin     Pcn [Penicillins]          MEDICATIONS:      Current Outpatient Medications   Medication Sig Dispense Refill    amLODIPine (NORVASC) 10 MG tablet TAKE ONE TABLET BY MOUTH ONCE A DAY 30 tablet 3    losartan-hydrochlorothiazide (HYZAAR) 100-25 MG per tablet TAKE 1 TABLET BY MOUTH DAILY \ 30 tablet 3    omeprazole (PRILOSEC) 20 MG delayed release capsule TAKE 1 CAPSULE BY MOUTH DAILY 30 capsule 3    nystatin (MYCOSTATIN) 405284 UNIT/GM cream Apply topically daily as needed for Dry Skin 1 Tube 5     No current facility-administered medications for this visit. SOCIAL HISTORY    Reviewed and no change from previous record. Kamari Elizondo  reports that she has never smoked.  She has never used smokeless tobacco.    FAMILY HISTORY:    Reviewed and No change from previous visit    REVIEW OF SYSTEMS:    CONSTITUTIONAL: Denies: fever, chills  PSYCH: Denies: anxiety, depression  ALLERGIES: Denies: urticaria  EYES: Denies: blurry vision, decreased vision, photophobia  ENT: Denies: sore throat, nasal congestion  CARDIOVASCULAR: Denies: chest pain, dyspnea on exertion  RESPIRATORY: Denies: cough, hemoptysis, shortness of breath  GI: Denies: Denies: abdominal pain, flank pain  : Denies: Denies: dysuria, frequency/urgency  NEURO: Denies: dizzy/vertigo, headache  MUSCULOSKELETAL: Denies: back pain, joint pain  SKIN: Denies: rash, itching    PHYSICAL EXAM:      Vitals:    07/29/20 1341   BP: 123/64   Site: Left Upper Arm   Position: Sitting   Cuff Size: Large Adult   Pulse: 74   Temp: 97.9 °F (36.6 °C)   Weight: 257 lb (116.6 kg)   Height: 5' 3\" (1.6 m)     BP Readings from Last 3 Encounters:   07/29/20 123/64   02/20/20 126/63   09/24/19 130/65      General appearance - alert, well appearing, and in no distress  Mental status - alert, oriented to person, place, and time  Chest - clear to auscultation, no wheezes, rales or rhonchi, symmetric air entry  Abdomen - soft, nontender, nondistended, no masses or organomegaly  Extremities - pedal edema 1+ bilaterally up to knees    LABORATORY FINDINGS:    CBC:  Lab Results   Component Value Date    WBC 7.7 03/01/2019    HGB 12.2 03/01/2019     03/01/2019     04/29/2012       BMP:    Lab Results   Component Value Date     03/01/2019    K 3.8 03/01/2019     03/01/2019    CO2 26 03/01/2019    BUN 12 03/01/2019    CREATININE 0.84 03/01/2019    GLUCOSE 110 03/01/2019    GLUCOSE 119 04/29/2012       HEMOGLOBIN A1C:   Lab Results   Component Value Date    LABA1C 6.3 02/20/2020       FASTING LIPID PANEL:  Lab Results   Component Value Date    CHOL 161 03/01/2019    HDL 38 (L) 03/01/2019    TRIG 78 03/01/2019       ASSESSMENT AND PLAN:    Geoff Carrera was seen today for leg swelling. Diagnoses and all orders for this visit:    1. Bilateral edema of lower extremity    - furosemide (LASIX) 20 MG tablet; Take 1 tablet by mouth daily  Dispense: 60 tablet; Refill: 3    2. Essential hypertension    - amLODIPine (NORVASC) 10 MG tablet; TAKE ONE TABLET BY MOUTH ONCE A DAY  Dispense: 30 tablet; Refill: 3  - losartan-hydrochlorothiazide (HYZAAR) 100-25 MG per tablet; Take 1 tablet by mouth daily  Dispense: 30 tablet; Refill: 3    3. Gastroesophageal reflux disease, esophagitis presence not specified    - omeprazole (PRILOSEC) 20 MG delayed release capsule; Take 1 capsule by mouth daily  Dispense: 30 capsule; Refill: 3    4. MACHELLE on CPAP    5. Pre-diabetes          FOLLOW UP AND INSTRUCTIONS:   · No follow-ups on file. · Roxie received counseling on the following healthy behaviors: nutrition and exercise    · Discussed use, benefit, and side effects of prescribed medications. Barriers to medication compliance addressed. All patient questions answered. Pt voiced understanding. · Patient given educational materials - see patient instructions    Ryann Meléndez MD  Internal Medicine Resident, PGY-2  [facility];  [place], [place]  7/29/2020, 2:22 PM

## 2020-07-29 NOTE — PROGRESS NOTES
Visit Information    Have you changed or started any medications since your last visit including any over-the-counter medicines, vitamins, or herbal medicines? no   Have you stopped taking any of your medications? Is so, why? -  no  Are you having any side effects from any of your medications? - no    Have you seen any other physician or provider since your last visit?  no   Have you had any other diagnostic tests since your last visit?  no   Have you been seen in the emergency room and/or had an admission in a hospital since we last saw you?  no   Have you had your routine dental cleaning in the past 6 months?  no     Do you have an active MyChart account? If no, what is the barrier?   No:     Patient Care Team:  Margot Brown MD as PCP - General (Internal Medicine)  Margot Brown MD as PCP - [facility]    Medical History Review  Past Medical, Family, and Social History reviewed and does contribute to the patient presenting condition    Health Maintenance   Topic Date Due    Potassium monitoring  03/01/2020    Creatinine monitoring  03/01/2020    Cervical cancer screen  06/22/2020    Breast cancer screen  08/06/2020    DTaP/Tdap/Td vaccine (1 - Tdap) 02/20/2021 (Originally 3/6/1982)    Shingles Vaccine (1 of 2) 02/20/2021 (Originally 3/6/2013)    Flu vaccine (1) 09/01/2020    A1C test (Diabetic or Prediabetic)  02/20/2021    Lipid screen  03/01/2024    Colon cancer screen colonoscopy  10/27/2026    Hepatitis C screen  Completed    HIV screen  Completed    Hepatitis A vaccine  Aged Out    Hepatitis B vaccine  Aged Out    Hib vaccine  Aged Out    Meningococcal (ACWY) vaccine  Aged Out    Pneumococcal 0-64 years Vaccine  Aged Out

## 2020-07-29 NOTE — PROGRESS NOTES
Patient here with complaints of lower extremity edema. It is worse in the daytime and better at night when she lays down. She is on Norvasc. She has never had this before. No orthopnea. She has hypertension which is well controlled. She has MACHELLE and she states she is more compliant with her CPAP now. No calf tenderness. Edema is 1+ up to her knees. Will advise echocardiogram and low-dose Lasix for some time. Advised low-salt diet and compression socks as needed. We will follow-up in 2 to 3 months time in office. Labs pending including BMP lipids. Attending Physician Statement  I have discussed the care of Tessy Villanueva, including pertinent history and exam findings,  with the resident. I have reviewed the key elements of all parts of the encounter with the resident. I agree with the assessment, plan and orders as documented by the resident.   (GE Modifier)

## 2020-08-10 ENCOUNTER — TELEPHONE (OUTPATIENT)
Dept: INTERNAL MEDICINE | Age: 55
End: 2020-08-10

## 2020-10-12 ENCOUNTER — TELEPHONE (OUTPATIENT)
Dept: INTERNAL MEDICINE | Age: 55
End: 2020-10-12

## 2020-10-14 ENCOUNTER — TELEPHONE (OUTPATIENT)
Dept: INTERNAL MEDICINE | Age: 55
End: 2020-10-14

## 2020-10-14 NOTE — LETTER
[address] [address]  Phone: [phone]  Fax: [phone]    Karsten Couch MD        October 14, 2020    [address] [place] [address]      Dear Eddie Frias: We are sending this letter because your PCP ordered Mammogram for you to have done at your last visit here and they have not yet been completed. If you can please come to our office on the 2nd floor to  your orders to have them compelted. If you do not have a follow-up appointment scheduled you can either contact the office to make an appointment with us or you can make one when you come in to pick-up your orders. Please call [phone] to schedule test.       If you have any questions or concerns, please don't hesitate to call.     Sincerely,        Karsten Couch MD

## 2020-11-06 ENCOUNTER — TELEPHONE (OUTPATIENT)
Dept: INTERNAL MEDICINE | Age: 55
End: 2020-11-06

## 2020-11-11 NOTE — TELEPHONE ENCOUNTER
PC from patient - patient states that she does not have any discharge only has an odor. Patient does not want to go to Urgent Care. Patient states she will wait until appt on 11/19/2020 to discuss matter.

## 2020-11-23 ENCOUNTER — TELEPHONE (OUTPATIENT)
Dept: INTERNAL MEDICINE | Age: 55
End: 2020-11-23

## 2020-11-23 NOTE — LETTER
[address] [facility] [address] [address]  Phone: [phone]  Fax: [phone]    Griffin Maher MD        November 23, 2020    [address] [place] [address]      Dear Nallely Gerber: This letter is a reminder that you may have diagnostic testing that has not been completed. It is important to your well-being that these test(s) are performed. Please find the outstanding test(s) attached. If you could please have these completed before your next appointment. You can have the test completed at any [facility] facility or Lab. Please see the order for scheduling instructions. Otherwise can be done at any [facility]. Please call our office at Dept: [phone] for additional information on the outstanding tests or let us know if they have been completed so we may update your chart. If you have any questions or concerns, please don't hesitate to call.     Sincerely,        Griffin Maher MD

## 2021-01-25 DIAGNOSIS — R60.0 BILATERAL EDEMA OF LOWER EXTREMITY: ICD-10-CM

## 2021-01-25 DIAGNOSIS — K21.9 GASTROESOPHAGEAL REFLUX DISEASE: ICD-10-CM

## 2021-01-25 DIAGNOSIS — I10 ESSENTIAL HYPERTENSION: ICD-10-CM

## 2021-01-25 RX ORDER — OMEPRAZOLE 20 MG/1
20 CAPSULE, DELAYED RELEASE ORAL DAILY
Qty: 30 CAPSULE | Refills: 3 | Status: SHIPPED | OUTPATIENT
Start: 2021-01-25 | End: 2021-09-21 | Stop reason: SDUPTHER

## 2021-01-25 RX ORDER — FUROSEMIDE 20 MG/1
20 TABLET ORAL DAILY
Qty: 30 TABLET | Refills: 3 | Status: SHIPPED | OUTPATIENT
Start: 2021-01-25 | End: 2021-09-21 | Stop reason: SDUPTHER

## 2021-01-25 RX ORDER — AMLODIPINE BESYLATE 10 MG/1
TABLET ORAL
Qty: 30 TABLET | Refills: 3 | Status: SHIPPED | OUTPATIENT
Start: 2021-01-25 | End: 2021-09-21 | Stop reason: SDUPTHER

## 2021-02-01 ENCOUNTER — TELEPHONE (OUTPATIENT)
Dept: INTERNAL MEDICINE | Age: 56
End: 2021-02-01

## 2021-02-01 NOTE — LETTER
[address] [address]  Phone: [phone]  Fax: [phone]    Vivien Hinton MD        February 1, 2021    [address] [place] [address]      Dear Tony Lemon: This letter is a reminder that you may have diagnostic testing that has not been completed. It is important to your well-being that these test(s) are performed. Please find the outstanding test(s) attached. If you could please have these completed before your next appointment. You can have the test completed at any [facility] facility or Lab. Please see the order for scheduling instructions. Any testing that needs completed other than blood work or xray's please call [phone] to schedule an appointment. Otherwise can be done at any [facility]. Please call our office at Dept: [phone] for additional information on the outstanding tests or let us know if they have been completed so we may update your chart. If you have any questions or concerns, please don't hesitate to call.     Sincerely,        Vivien Hinton MD

## 2021-02-12 ENCOUNTER — HOSPITAL ENCOUNTER (OUTPATIENT)
Age: 56
Setting detail: SPECIMEN
Discharge: HOME | End: 2021-02-12

## 2021-02-12 ENCOUNTER — OFFICE VISIT (OUTPATIENT)
Dept: INTERNAL MEDICINE | Age: 56
End: 2021-02-12

## 2021-02-12 VITALS
BODY MASS INDEX: 45 KG/M2 | WEIGHT: 254 LBS | HEART RATE: 75 BPM | DIASTOLIC BLOOD PRESSURE: 77 MMHG | SYSTOLIC BLOOD PRESSURE: 130 MMHG | HEIGHT: 63 IN

## 2021-02-12 DIAGNOSIS — G47.33 OSA (OBSTRUCTIVE SLEEP APNEA): ICD-10-CM

## 2021-02-12 DIAGNOSIS — R73.03 PRE-DIABETES: ICD-10-CM

## 2021-02-12 DIAGNOSIS — E66.01 MORBID OBESITY (HCC): ICD-10-CM

## 2021-02-12 DIAGNOSIS — I10 ESSENTIAL HYPERTENSION: Primary | ICD-10-CM

## 2021-02-12 DIAGNOSIS — I10 ESSENTIAL HYPERTENSION: ICD-10-CM

## 2021-02-12 LAB
ANION GAP SERPL CALCULATED.3IONS-SCNC: 11 MMOL/L (ref 9–17)
BUN BLDV-MCNC: 16 MG/DL (ref 6–20)
BUN/CREAT BLD: ABNORMAL (ref 9–20)
CALCIUM SERPL-MCNC: 10.2 MG/DL (ref 8.6–10.4)
CHLORIDE BLD-SCNC: 101 MMOL/L (ref 98–107)
CO2: 28 MMOL/L (ref 20–31)
CREAT SERPL-MCNC: 0.81 MG/DL (ref 0.5–0.9)
GFR AFRICAN AMERICAN: >60 ML/MIN
GFR NON-AFRICAN AMERICAN: >60 ML/MIN
GFR SERPL CREATININE-BSD FRML MDRD: ABNORMAL ML/MIN/{1.73_M2}
GFR SERPL CREATININE-BSD FRML MDRD: ABNORMAL ML/MIN/{1.73_M2}
GLUCOSE BLD-MCNC: 114 MG/DL (ref 70–99)
HBA1C MFR BLD: 6.4 %
POTASSIUM SERPL-SCNC: 4.1 MMOL/L (ref 3.7–5.3)
SODIUM BLD-SCNC: 140 MMOL/L (ref 135–144)

## 2021-02-12 PROCEDURE — 99211 OFF/OP EST MAY X REQ PHY/QHP: CPT | Performed by: INTERNAL MEDICINE

## 2021-02-12 PROCEDURE — 99213 OFFICE O/P EST LOW 20 MIN: CPT | Performed by: INTERNAL MEDICINE

## 2021-02-12 PROCEDURE — 83036 HEMOGLOBIN GLYCOSYLATED A1C: CPT | Performed by: INTERNAL MEDICINE

## 2021-02-12 RX ORDER — LOSARTAN POTASSIUM AND HYDROCHLOROTHIAZIDE 25; 100 MG/1; MG/1
1 TABLET ORAL DAILY
Qty: 30 TABLET | Refills: 5 | Status: SHIPPED | OUTPATIENT
Start: 2021-02-12 | End: 2021-09-21 | Stop reason: SDUPTHER

## 2021-02-12 ASSESSMENT — ENCOUNTER SYMPTOMS
SHORTNESS OF BREATH: 0
PHOTOPHOBIA: 0
COUGH: 0
WHEEZING: 0
CONSTIPATION: 0
ABDOMINAL PAIN: 0

## 2021-02-12 ASSESSMENT — PATIENT HEALTH QUESTIONNAIRE - PHQ9: 1. LITTLE INTEREST OR PLEASURE IN DOING THINGS: 0

## 2021-02-12 NOTE — PROGRESS NOTES
@[facility]@    [facility]    Progress Note    Date of patient's visit: 2/12/2021    Patient's Name:  Chandler Smith    YOB: 1965            Patient Care Team:  Basilia Sellers MD as PCP - General (Internal Medicine)  Basilia Sellers MD as PCP - [facility]    REASON FOR VISIT: Routine outpatient follow     Chief Complaint   Patient presents with    Hypertension    Health Maintenance     Patient does not have insurance, she said she does not have money          HISTORY OF PRESENT ILLNESS:    History was obtained from the patient. Chandler Smith is a 60 y.o. is here for follow-up of her chronic conditions. She has hypertension, impaired glucose tolerance, hyperlipidemia, MACHELLE, osteoarthritis and moderate obesity. Unfortunately she has not had insurance for some time now and has not been able to afford all her tests and screening. She has been able to get her medications. She has not been using CPAP. She had bilateral leg edema when she was seen last time and was started on Lasix which has helped a lot. She says she no longer has edema. She says she has been able to afford her medications but has a hard time paying for her tests. She is given some forms to get help from the financial department at the hospital and she will apply for help. She is also advised her a program for mammograms and Pap smears. We will try to get her some information regarding the so she can get the screening test done. Blood pressure today is controlled. Will her A1c is 6.4. We will get a BMP only and will avoid other testing for now as she has financial distress but she is advised to try job and family services and see if she qualifies for Medicaid. Spent a lot of time today counseling regarding her diet. She has been eating a lot of starchy foods and high caloric diet. She does not  Generally eat much vegetables or fruit and we discussed these choices. shortness of breath and wheezing. Cardiovascular: Negative for chest pain, palpitations and leg swelling. Gastrointestinal: Negative for abdominal pain and constipation. Endocrine: Negative for polydipsia and polyuria. Genitourinary: Negative for dysuria and frequency. Neurological: Negative for dizziness, seizures, syncope, weakness and headaches. Hematological: Negative for adenopathy. Does not bruise/bleed easily. PHYSICAL EXAM:     Vitals:    02/12/21 0934   BP: 130/77   Site: Left Upper Arm   Position: Sitting   Cuff Size: Large Adult   Pulse: 75   Weight: 254 lb (115.2 kg)   Height: 5' 3\" (1.6 m)     Body mass index is 44.99 kg/m². BP Readings from Last 3 Encounters:   02/12/21 130/77   07/29/20 123/64   02/20/20 126/63        Wt Readings from Last 3 Encounters:   02/12/21 254 lb (115.2 kg)   07/29/20 257 lb (116.6 kg)   02/20/20 256 lb (116.1 kg)       Physical Exam  Vitals signs and nursing note reviewed. Constitutional:       Appearance: Normal appearance. HENT:      Head: Normocephalic and atraumatic. Eyes:      Extraocular Movements: Extraocular movements intact. Conjunctiva/sclera: Conjunctivae normal.      Pupils: Pupils are equal, round, and reactive to light. Cardiovascular:      Rate and Rhythm: Normal rate and regular rhythm. Heart sounds: No murmur. Pulmonary:      Effort: Pulmonary effort is normal.      Breath sounds: Normal breath sounds. Musculoskeletal:      Right lower leg: No edema. Left lower leg: No edema. Skin:     General: Skin is warm and dry. Neurological:      General: No focal deficit present. Mental Status: She is alert and oriented to person, place, and time.              LABORATORY FINDINGS:    CBC:  Lab Results   Component Value Date    WBC 7.7 03/01/2019    HGB 12.2 03/01/2019     03/01/2019     04/29/2012     BMP:    Lab Results   Component Value Date     03/01/2019    K 3.8 03/01/2019     03/01/2019    CO2 26 03/01/2019    BUN 12 03/01/2019    CREATININE 0.84 03/01/2019    GLUCOSE 110 03/01/2019    GLUCOSE 119 04/29/2012     HEMOGLOBIN A1C:   Lab Results   Component Value Date    LABA1C 6.3 02/20/2020     MICROALBUMIN URINE:   Lab Results   Component Value Date    MICROALBUR 15 03/01/2019     FASTING LIPID [email or web address]  Lab Results   Component Value Date    LDLCHOLESTEROL 107 03/01/2019       LIVER PROFILE:  Lab Results   Component Value Date    ALT 14 08/08/2016    AST 18 08/08/2016    PROT 7.8 08/08/2016    BILITOT 0.51 08/08/2016    BILIDIR 0.10 08/08/2016    LABALBU 3.9 08/08/2016      THYROID FUNCTION:   Lab Results   Component Value Date    TSH 2.27 10/27/2017      URINEANALYSIS: No results found for: LABURIN  ASSESSMENT AND PLAN:    1. Essential hypertension    - losartan-hydroCHLOROthiazide (HYZAAR) 100-25 MG per tablet; Take 1 tablet by mouth daily  Dispense: 30 tablet; Refill: 5  - Basic Metabolic Panel; Future    2. Pre-diabetes    - POCT glycosylated hemoglobin (Hb A1C)  - Basic Metabolic Panel; Future    3. Morbid obesity ([address]      4. MACHELLE (obstructive sleep apnea)  Not on cpap          FOLLOW UP AND INSTRUCTIONS:   Return in about 6 months (around 8/12/2021). 1. Roxie received counseling on the following healthy behaviors: nutrition, exercise and medication adherence    2. Reviewed prior labs and health maintenance. 3. Discussed use, benefit, and side effects of prescribed medications. Barriers to medication compliance addressed. All patient questions answered. Pt voiced understanding.      4. Patient given educational materials - see patient instructions    Elsie Luna  Attending Physician, [address] [place] [address], Internal Medicine Residency Program  [address] [place] [address]  2/12/2021, 9:54 AM

## 2021-02-12 NOTE — PATIENT INSTRUCTIONS
Labs given to patient, they will have them done before their next visit. Medications e-scribe to pharmacy of pt's choice. Patient was given a form for the [address],     Patient was put on a wait list and will be contacted to schedule their next follow up appointment once the schedule is available. If the patient is in need of an appointment before their next visit please call the office at [phone]. After Visit Summary  given and reviewed.     JANNA

## 2021-04-28 ENCOUNTER — TELEPHONE (OUTPATIENT)
Dept: INTERNAL MEDICINE | Age: 56
End: 2021-04-28

## 2021-07-02 RX ORDER — NYSTATIN 100000 U/G
CREAM TOPICAL DAILY PRN
Qty: 1 TUBE | Refills: 5 | Status: SHIPPED | OUTPATIENT
Start: 2021-07-02

## 2021-07-02 NOTE — TELEPHONE ENCOUNTER
Pt is asking for a refill on nystatin cream. Medication is pended. Pt is on wait list for August, pt decline appt as she is in the process of getting insurance and wants to wait till she gets it. Health Maintenance   Topic Date Due    COVID-19 Vaccine (1) Never done    DTaP/Tdap/Td vaccine (1 - Tdap) Never done    Shingles Vaccine (1 of 2) Never done    Cervical cancer screen  06/22/2020    Breast cancer screen  08/06/2020    Flu vaccine (1) 09/01/2021    A1C test (Diabetic or Prediabetic)  02/12/2022    Potassium monitoring  02/12/2022    Creatinine monitoring  02/12/2022    Lipid screen  03/01/2024    Colon cancer screen colonoscopy  10/27/2026    Hepatitis C screen  Completed    HIV screen  Completed    Hepatitis A vaccine  Aged Out    Hepatitis B vaccine  Aged Out    Hib vaccine  Aged Out    Meningococcal (ACWY) vaccine  Aged Out    Pneumococcal 0-64 years Vaccine  Aged Out             (applicable per patient's age: Cancer Screenings, Depression Screening, Fall Risk Screening, Immunizations)    Hemoglobin A1C (%)   Date Value   02/12/2021 6.4   02/20/2020 6.3   03/01/2019 6.2 (H)     Microalb/Crt.  Ratio (mcg/mg creat)   Date Value   03/01/2019 4     LDL Cholesterol (mg/dL)   Date Value   03/01/2019 107     AST (U/L)   Date Value   08/08/2016 18     ALT (U/L)   Date Value   08/08/2016 14     BUN (mg/dL)   Date Value   02/12/2021 16      (goal A1C is < 7)   (goal LDL is <100) need 30-50% reduction from baseline     BP Readings from Last 3 Encounters:   02/12/21 130/77   07/29/20 123/64   02/20/20 126/63    (goal /80)      All Future Testing planned in [facility]:  Lab Frequency Next Occurrence   ECHO Complete 2D W Doppler W Color Once 07/29/2020       Next Visit Date:  Future Appointments   Date Time Provider Kiran Dewey   7/21/2021  9:15 AM STA DIAG MAMMO RM 3 [facility] MAMMO [facility] Radiolog   8/12/2021  8:30 AM [id number] [address] OB/Gyn [facility]            Patient Active Problem List:     HTN (hypertension)     Anemia     Obesity     Gout     MACHELLE on CPAP     Pre-diabetes

## 2021-07-14 ENCOUNTER — HOSPITAL ENCOUNTER (OUTPATIENT)
Dept: MAMMOGRAPHY | Facility: CLINIC | Age: 56
Discharge: HOME | End: 2021-07-16
Payer: COMMERCIAL

## 2021-07-14 DIAGNOSIS — Z12.31 VISIT FOR SCREENING MAMMOGRAM: ICD-10-CM

## 2021-07-14 PROCEDURE — 77067 SCR MAMMO BI INCL CAD: CPT

## 2021-08-12 ENCOUNTER — OFFICE VISIT (OUTPATIENT)
Dept: OBGYN | Age: 56
End: 2021-08-12
Payer: COMMERCIAL

## 2021-08-12 ENCOUNTER — HOSPITAL ENCOUNTER (OUTPATIENT)
Age: 56
Setting detail: SPECIMEN
Discharge: HOME | End: 2021-08-12
Payer: COMMERCIAL

## 2021-08-12 VITALS
DIASTOLIC BLOOD PRESSURE: 72 MMHG | HEART RATE: 70 BPM | WEIGHT: 256 LBS | SYSTOLIC BLOOD PRESSURE: 138 MMHG | BODY MASS INDEX: 45.35 KG/M2

## 2021-08-12 DIAGNOSIS — Z00.00 PREVENTATIVE HEALTH CARE: ICD-10-CM

## 2021-08-12 DIAGNOSIS — Z80.3 FAMILY HISTORY OF BREAST CANCER: ICD-10-CM

## 2021-08-12 DIAGNOSIS — Z01.419 WELL WOMAN EXAM WITH ROUTINE GYNECOLOGICAL EXAM: Primary | ICD-10-CM

## 2021-08-12 PROCEDURE — 99386 PREV VISIT NEW AGE 40-64: CPT | Performed by: OBSTETRICS & GYNECOLOGY

## 2021-08-12 NOTE — PROGRESS NOTES
History and Physical    Jaden Lopez  2021              60 y.o. Chief Complaint   Patient presents with    New Patient     New Patient, Annual        No LMP recorded. Patient is postmenopausal.             Primary Care Physician: Naomi Yarbrough MD    The patient was seen and examined. She has no chief complaint today and is here for her annual exam.  Her bowels are regular. There are no voiding complaints. She denies any bloating. She denies vaginal discharge and was counseled on STD's and the need for barrier contraception. HPI : Jaden Lopez is a 60 y.o. female     Pt here for annual exam.  Pt denies any chief complaint and has no issues/concerns.   ________________________________________________________________________  OB History    Para Term  AB Living   2 1 1 0 1 1   SAB TAB Ectopic Molar Multiple Live Births   0 1 0 0 0 0      # Outcome Date GA Lbr Abraham/2nd Weight Sex Delivery Anes PTL Lv   2 Term 92    M CS-Unspec      1 TAB              Past Medical History:   Diagnosis Date    Hyperlipidemia     Hypertension     Obesity                                                                    Past Surgical History:   Procedure Laterality Date     SECTION      CHOLECYSTECTOMY      TUBAL LIGATION Bilateral     at time of csection     Family History   Problem Relation Age of Onset    Breast Cancer Sister      Social History     Socioeconomic History    Marital status: Single     Spouse name: Not on file    Number of children: Not on file    Years of education: Not on file    Highest education level: Not on file   Occupational History    Not on file   Tobacco Use    Smoking status: Never Smoker    Smokeless tobacco: Never Used   Substance and Sexual Activity    Alcohol use: No    Drug use: No    Sexual activity: Not on file   Other Topics Concern    Not on file   Social History Narrative    Not on file     Social Determinants of Health Financial Resource Strain:     Difficulty of Paying Living Expenses:    Food Insecurity:     Worried About Running Out of Food in the Last Year:     [address] in the Last Year:    Transportation Needs:     Lack of Transportation (Medical):  Lack of Transportation (Non-Medical):    Physical Activity:     Days of Exercise per Week:     Minutes of Exercise per Session:    Stress:     Feeling of Stress :    Social Connections:     Frequency of Communication with Friends and Family:     Frequency of Social Gatherings with Friends and Family:     Attends Mu-ism Services:     Active Member of Clubs or Organizations:     Attends Club or Organization Meetings:     Marital Status:    Intimate Partner Violence:     Fear of Current or Ex-Partner:     Emotionally Abused:     Physically Abused:     Sexually Abused:        MEDICATIONS:  Current Outpatient Medications   Medication Sig Dispense Refill    nystatin (MYCOSTATIN) 785122 UNIT/GM cream Apply topically daily as needed for Dry Skin 1 Tube 5    losartan-hydroCHLOROthiazide (HYZAAR) 100-25 MG per tablet Take 1 tablet by mouth daily \ 30 tablet 5    omeprazole (PRILOSEC) 20 MG delayed release capsule TAKE 1 CAPSULE BY MOUTH DAILY 30 capsule 3    amLODIPine (NORVASC) 10 MG tablet TAKE ONE TABLET BY MOUTH ONCE A DAY 30 tablet 3    furosemide (LASIX) 20 MG tablet TAKE 1 TABLET BY MOUTH DAILY 30 tablet 3     No current facility-administered medications for this visit. ALLERGIES:  Allergies as of 08/12/2021 - Fully Reviewed 08/12/2021   Allergen Reaction Noted    Codeine  06/27/2012    Dicloxacillin  06/01/2016    Pcn [penicillins]  06/27/2012       Symptoms of decreased mood absent  Symptoms of anhedonia absent      Immunization status: stated as current, but no records available. Gynecologic History:  Menarche: 13 yo  Menopause: 37 yo     No LMP recorded.  Patient is postmenopausal.    Sexually Active: No    STD History: No Permanent Sterilization: Yes - tubal at time of csection in 1992   Reversible Birth Control: No        Hormone Replacement Exposure: No      Genetic Qualified Family History of Breast, Ovarian , Colon or Uterine Cancer: Yes - pt's sister had breast cancer at age 48 - asked pt to see if her sister was BRCA positive     If YES see scanned worksheet. Preventative Health Testing:    Health Maintenance:  Health Maintenance Due   Topic Date Due    DTaP/Tdap/Td vaccine (1 - Tdap) Never done    Shingles Vaccine (1 of 2) Never done    Cervical cancer screen  06/22/2020    COVID-19 Vaccine (2 - Pfizer 2-dose series) 05/31/2021       Date of Last Pap Smear: 2017 - negative cytology, no HPV ordered  Abnormal Pap Smear History: denies  Colposcopy History: denies  Date of Last Mammogram: 7/14/21 - birads 2  Date of Last Colonoscopy: 10/27/2016  Date of Last Bone Density: n/a      ________________________________________________________________________        REVIEW OF SYSTEMS:       A minimum of an eleven point review of systems was completed. Review Of Systems (11 point):  Constitutional: No fever, chills or malaise; No weight change or fatigue  Head and Eyes: No vision, Headache, Dizziness or trauma in last 12 months  ENT ROS: No hearing, Tinnitis, sinus or taste problems  Hematological and Lymphatic ROS:No Lymphoma, Von Willebrand's, Hemophillia or Bleeding History  Psych ROS: No Depression, Homicidal thoughts,suicidal thoughts, or anxiety  Breast ROS: No prior breast abnormalities or lumps  Respiratory ROS: No SOB, Pneumoniae,Cough, or Pulmonary Embolism History  Cardiovascular ROS: No Chest Pain with Exertion, Palpitations, Syncope, Edema, Arrhythmia  Gastrointestinal ROS: No Indigestion, Heartburn, Nausea, vomiting, Diarrhea, Constipation,or Bowel Changes; No Bloody Stools or melena  Genito-Urinary ROS: No Dysuria, Hematuria or Nocturia.  No Urinary Incontinence or Vaginal Discharge  Musculoskeletal ROS: No Arthralgia, Arthritis,Gout,Osteoporosis or Rheumatism  Neurological ROS: No CVA, Migraines, Epilepsy, Seizure Hx, or Limb Weakness  Dermatological ROS: No Rash, Itching, Hives, Mole Changes or Cancer                                                                                                                                                                                                                                  PHYSICAL Exam:     Constitutional:  Vitals:    08/12/21 0822   BP: 138/72   Pulse: 70   Weight: 256 lb (116.1 kg)       Chaperone for Intimate Exam   Chaperone was offered and accepted as part of the rooming process.  Chaperone: Archana Fuentes MA          General Appearance: This  is a well Developed, well Nourished, well groomed female. Her BMI was reviewed. Nutritional decision making was discussed. Skin:  There was a Normal Inspection of the skin without rashes or lesions. There were no rashes. (Papular, Maculopapular, Hives, Pustular, Macular)     There were no lesions (Ulcers, Erythema, Abn. Appearing Nevi)            Lymphatic:  No Lymph Nodes were Palpable in the neck , axilla or groin.  0 # Of Lymph Nodes; Location ; Character [Normal]  [Shotty] [Tender] [Enlarged]     Neck and EENT:  The neck was supple. There were no masses   The thyroid was not enlarged and had no masses. Perrla, EOMI B/L, TMI B/L No Abnormalities. Throat inspected-No exudates or Masses, Nares Patent No Masses        Respiratory: The lungs were auscultated and found to be clear. There were no rales, rhonchi or wheezes. There was a good respiratory effort. Cardiovascular: The heart was in a regular rate and rhythm. . No S3 or S4. There was no murmur appreciated. Location, grade, and radiation are not applicable. Extremities: The patients extremities were without calf tenderness, edema, or varicosities. There was full range of motion in all four extremities.  Pulses in all four extremities were appreciated and are 2/4. Abdomen: The abdomen was soft and non-tender. There were good bowel sounds in all quadrants and there was no guarding, rebound or rigidity. On evaluation there was no evidence of hepatosplenomegaly and there was no costal vertebral brie tenderness bilaterally. No hernias were appreciated. Abdominal Scars: pfannenstiel from csection    Psych: The patient had a normal Orientation to: Time, Place, Person, and Situation  There is no Mood / Affect changes    Breast:  (Chest)  normal appearance, no masses or tenderness, Inspection negative, No nipple retraction or dimpling, No nipple discharge or bleeding, No axillary or supraclavicular adenopathy, Normal to palpation without dominant masses, Taught monthly breast self examination  Self breast exams were reviewed in detail. Literature was given. Pelvic Exam:  External genitalia: normal general appearance  Urinary system: urethral meatus normal  Vaginal: normal mucosa without prolapse or lesions, atrophic mucosa and vaginitis probe obtained  Cervix: normal appearance  Adnexa: difficult exam secondary to body habitus, no obvious masses palpated  Uterus: difficult exam secondary to body habitus, no obvious masses palpated      Musculosk:  Normal Gait and station was noted. Digits were evaluated without abnormal findings. Range of motion, stability and strength were evaluated and found to be appropriate for the patients age. OMM Structural Component:  The patient did not complain of a Chief complaint requiring OMM. Chief Complaint:none    Structural Exam: No Interest                  ASSESSMENT:      60 y.o. Annual   Diagnosis Orders   1. Well woman exam with routine gynecological exam  PAP Smear    Chlamydia Trachomatis & Neisseria gonorrhoeae (GC) by amplified detection    Vaginitis DNA Probe   2. Preventative health care  PAP Smear   3.  Family history of breast cancer  [address] [place] [address] Chief Complaint   Patient [facility] with   [facility]     New Patient, Annual           Past Medical History:   Diagnosis Date    Hyperlipidemia     Hypertension     Obesity          Patient Active Problem List    Diagnosis Date Noted    Pre-diabetes 07/29/2020    MACHELLE on CPAP 10/27/2017    Gout 07/07/2014    Anemia 05/03/2013    Obesity 05/03/2013    HTN (hypertension) 06/27/2012          Hereditary Breast, Ovarian, Colon and Uterine Cancer screening Done. Tobacco & Secondary smoke risks reviewed; instructed on cessation and avoidance      Counseling Completed:  Preventative Health Recommendations and Follow up. The patient was informed of the recommended preventative health recommendations. 1. Annuals every year; Cytology collections per prevailing guidelines. 2. Mammograms begin every year at 35 yo if no abnormalities are found and no family history. 3. Bone density studies every 2-3 years. Begin at 73 yo. If no fracture history or osteoporosis family history. (significant). 4. Colonoscopy begin at 38 yo. Repeat every ten years if negative and no family history. 5. Calcium of 9255-4840 mg/day in split dosing  6. Vitamin D 400-800 IU/day  7. All other preventative health recommendations will be managed by the patients Primary care physician. PLAN:  Pap smear obtained - will follow ASCCP guidelines  Vaginal cultures collected - will treat if appropriate  Will refer pt to genetic counselor because of 1st degree relative with breast cancer. Return in about 1 year (around 8/12/2022) for Annual Exam.  Repeat Annual every 1 year  Cervical Cytology Evaluation begins at 24years old. If Negative Cytology, Follow-up screening per current guidelines. Mammograms every 1 year. If 35 yo and last mammogram was negative. Calcium and Vitamin D dosing reviewed. Colonoscopy screening reviewed as well as onset for bone density testing.   Birth control and barrier recommendations discussed. STD counseling and prevention reviewed. Gardisil counseling completed for all patients 10-35 yo. Routine health maintenance per patients PCP. The patient, Kindra Cortes is a 60 y.o. female, was seen with a total time spent of 30 minutes for the visit on this date of service by the E/M provider. The time component had both face to face and non face to face time spent in determining the total time component. Counseling and education regarding her diagnosis listed below and her options regarding those diagnoses were also included in determining her time component. Diagnosis Orders   1. Well woman exam with routine gynecological exam  PAP Smear    Chlamydia Trachomatis & Neisseria gonorrhoeae (GC) by amplified detection    Vaginitis DNA Probe   2. Preventative health care  PAP Smear   3. Family history of breast cancer  [address], [facility]        The patient had her preventative health maintenance recommendations and follow-up reviewed with her at the completion of her visit.     Evy Arredondo DO

## 2021-08-13 DIAGNOSIS — Z01.419 WELL WOMAN EXAM WITH ROUTINE GYNECOLOGICAL EXAM: ICD-10-CM

## 2021-08-13 LAB
DIRECT EXAM: ABNORMAL
Lab: ABNORMAL
SPECIMEN DESCRIPTION: ABNORMAL

## 2021-08-13 RX ORDER — METRONIDAZOLE 500 MG/1
500 TABLET ORAL 2 TIMES DAILY
Qty: 14 TABLET | Refills: 0 | Status: SHIPPED | OUTPATIENT
Start: 2021-08-13 | End: 2021-08-20

## 2021-08-14 LAB
HPV SAMPLE: ABNORMAL
HPV, GENOTYPE 16: NOT DETECTED
HPV, GENOTYPE 18: NOT DETECTED
HPV, HIGH RISK OTHER: DETECTED
HPV, INTERPRETATION: ABNORMAL
SPECIMEN DESCRIPTION: ABNORMAL

## 2021-08-16 LAB
C TRACH DNA GENITAL QL NAA+PROBE: NEGATIVE
N. GONORRHOEAE DNA: NEGATIVE
SPECIMEN DESCRIPTION: NORMAL

## 2021-08-20 LAB — CYTOLOGY REPORT: NORMAL

## 2021-08-22 PROBLEM — R87.810 CERVICAL HIGH RISK HPV (HUMAN PAPILLOMAVIRUS) TEST POSITIVE: Status: ACTIVE | Noted: 2021-08-22

## 2021-08-25 ENCOUNTER — TELEPHONE (OUTPATIENT)
Dept: INTERNAL MEDICINE | Age: 56
End: 2021-08-25

## 2021-08-25 NOTE — TELEPHONE ENCOUNTER
----- Message from [facility] MA sent at 8/25/2021 10:47 AM EDT -----  Subject: Appointment Request    Reason for Call: Routine Existing Condition Follow Up    QUESTIONS  Type of Appointment? Established Patient  Reason for appointment request? No appointments available during search  Additional Information for Provider? Pt is needing a HTN follow up as she   was told that she is not able to get anymore refills until she completes   an office visit. Please call patient and schedule accordingly.   ---------------------------------------------------------------------------  --------------  CALL BACK INFO  What is the best way for the office to contact you? OK to leave message on   voicemail  Preferred Call Back Phone Number? [phone]  ---------------------------------------------------------------------------  --------------  SCRIPT ANSWERS  Relationship to Patient? Self  (Is the patient requesting to be seen urgently for their symptoms?)? No  Is this follow up request related to routine Diabetes Management? No  Are you having any new concerns about your existing condition? No  Have you been diagnosed with, awaiting test results for, or told that you   are suspected of having COVID-19 (Coronavirus)? (If patient has tested   negative or was tested as a requirement for work, school, or travel and   not based on symptoms, answer no)? No  Do you currently have flu-like symptoms including fever or chills, cough,   shortness of breath, difficulty breathing, or new loss of taste or smell? No  Have you had close contact with someone with COVID-19 in the last 14 days? No  (Service Expert  click yes below to proceed with [facility] As Usual   Scheduling)?  Yes

## 2021-08-25 NOTE — TELEPHONE ENCOUNTER
Patient called back, scheduled with PCR as PCP is not available and patient will need refills on medications starting in October.

## 2021-09-21 DIAGNOSIS — I10 ESSENTIAL HYPERTENSION: ICD-10-CM

## 2021-09-21 DIAGNOSIS — K21.9 GASTROESOPHAGEAL REFLUX DISEASE: ICD-10-CM

## 2021-09-21 DIAGNOSIS — R60.0 BILATERAL EDEMA OF LOWER EXTREMITY: ICD-10-CM

## 2021-09-21 RX ORDER — OMEPRAZOLE 20 MG/1
20 CAPSULE, DELAYED RELEASE ORAL DAILY
Qty: 30 CAPSULE | Refills: 3 | Status: SHIPPED | OUTPATIENT
Start: 2021-09-21 | End: 2022-01-27 | Stop reason: SDUPTHER

## 2021-09-21 RX ORDER — AMLODIPINE BESYLATE 10 MG/1
10 TABLET ORAL DAILY
Qty: 30 TABLET | Refills: 3 | Status: SHIPPED | OUTPATIENT
Start: 2021-09-21 | End: 2022-01-27 | Stop reason: SDUPTHER

## 2021-09-21 RX ORDER — FUROSEMIDE 20 MG/1
20 TABLET ORAL DAILY
Qty: 30 TABLET | Refills: 3 | Status: SHIPPED | OUTPATIENT
Start: 2021-09-21 | End: 2022-01-27 | Stop reason: SDUPTHER

## 2021-09-21 RX ORDER — LOSARTAN POTASSIUM AND HYDROCHLOROTHIAZIDE 25; 100 MG/1; MG/1
1 TABLET ORAL DAILY
Qty: 30 TABLET | Refills: 5 | Status: SHIPPED | OUTPATIENT
Start: 2021-09-21 | End: 2022-01-27 | Stop reason: SDUPTHER

## 2021-09-21 NOTE — TELEPHONE ENCOUNTER
Pt needs refill on medications, unable to schedule appt at this time. Added pt to wait list. Medication pended. Health Maintenance   Topic Date Due    DTaP/Tdap/Td vaccine (1 - Tdap) Never done    Shingles Vaccine (1 of 2) Never done    COVID-19 Vaccine (2 - Pfizer 2-dose series) 05/31/2021    Flu vaccine (1) Never done    A1C test (Diabetic or Prediabetic)  02/12/2022    Potassium monitoring  02/12/2022    Creatinine monitoring  02/12/2022    Breast cancer screen  07/14/2022    Lipid screen  03/01/2024    Cervical cancer screen  08/12/2026    Colon cancer screen colonoscopy  10/27/2026    Hepatitis C screen  Completed    HIV screen  Completed    Hepatitis A vaccine  Aged Out    Hepatitis B vaccine  Aged Out    Hib vaccine  Aged Out    Meningococcal (ACWY) vaccine  Aged Out    Pneumococcal 0-64 years Vaccine  Aged Out             (applicable per patient's age: Cancer Screenings, Depression Screening, Fall Risk Screening, Immunizations)    Hemoglobin A1C (%)   Date Value   02/12/2021 6.4   02/20/2020 6.3   03/01/2019 6.2 (H)     Microalb/Crt.  Ratio (mcg/mg creat)   Date Value   03/01/2019 4     LDL Cholesterol (mg/dL)   Date Value   03/01/2019 107     AST (U/L)   Date Value   08/08/2016 18     ALT (U/L)   Date Value   08/08/2016 14     BUN (mg/dL)   Date Value   02/12/2021 16      (goal A1C is < 7)   (goal LDL is <100) need 30-50% reduction from baseline     BP Readings from Last 3 Encounters:   08/12/21 138/72   02/12/21 130/77   07/29/20 123/64    (goal /80)      All Future Testing planned in [facility]:  Lab Frequency Next Occurrence   PAP Smear Once 08/12/2022       Next Visit Date:  Future Appointments   Date Time Provider Kiran Dewey   10/14/2021 11:00 AM [facility]            Patient Active Problem List:     HTN (hypertension)     Anemia     Obesity     Gout     MACHELLE on CPAP     Pre-diabetes     Cervical high risk HPV (human papillomavirus) test positive

## 2021-10-07 ENCOUNTER — HOSPITAL ENCOUNTER (OUTPATIENT)
Facility: MEDICAL CENTER | Age: 56
End: 2021-10-07

## 2022-01-27 ENCOUNTER — TELEMEDICINE (OUTPATIENT)
Dept: INTERNAL MEDICINE | Age: 57
End: 2022-01-27

## 2022-01-27 DIAGNOSIS — E66.01 MORBID OBESITY (HCC): ICD-10-CM

## 2022-01-27 DIAGNOSIS — G56.03 BILATERAL CARPAL TUNNEL SYNDROME: ICD-10-CM

## 2022-01-27 DIAGNOSIS — K21.9 GASTROESOPHAGEAL REFLUX DISEASE, UNSPECIFIED WHETHER ESOPHAGITIS PRESENT: ICD-10-CM

## 2022-01-27 DIAGNOSIS — R60.0 BILATERAL EDEMA OF LOWER EXTREMITY: ICD-10-CM

## 2022-01-27 DIAGNOSIS — R73.03 PRE-DIABETES: ICD-10-CM

## 2022-01-27 DIAGNOSIS — Z13.220 SCREENING FOR LIPID DISORDERS: ICD-10-CM

## 2022-01-27 DIAGNOSIS — G47.33 OSA (OBSTRUCTIVE SLEEP APNEA): ICD-10-CM

## 2022-01-27 DIAGNOSIS — I10 ESSENTIAL HYPERTENSION: Primary | ICD-10-CM

## 2022-01-27 PROCEDURE — 99213 OFFICE O/P EST LOW 20 MIN: CPT | Performed by: INTERNAL MEDICINE

## 2022-01-27 RX ORDER — OMEPRAZOLE 20 MG/1
20 CAPSULE, DELAYED RELEASE ORAL DAILY
Qty: 30 CAPSULE | Refills: 5 | Status: SHIPPED | OUTPATIENT
Start: 2022-01-27

## 2022-01-27 RX ORDER — FUROSEMIDE 20 MG/1
20 TABLET ORAL DAILY
Qty: 30 TABLET | Refills: 5 | Status: SHIPPED | OUTPATIENT
Start: 2022-01-27 | End: 2022-08-26

## 2022-01-27 RX ORDER — LOSARTAN POTASSIUM AND HYDROCHLOROTHIAZIDE 25; 100 MG/1; MG/1
1 TABLET ORAL DAILY
Qty: 30 TABLET | Refills: 5 | Status: SHIPPED | OUTPATIENT
Start: 2022-01-27 | End: 2022-09-21 | Stop reason: SDUPTHER

## 2022-01-27 RX ORDER — AMLODIPINE BESYLATE 10 MG/1
10 TABLET ORAL DAILY
Qty: 30 TABLET | Refills: 5 | Status: SHIPPED | OUTPATIENT
Start: 2022-01-27 | End: 2022-09-21 | Stop reason: SDUPTHER

## 2022-01-27 SDOH — ECONOMIC STABILITY: FOOD INSECURITY: WITHIN THE PAST 12 MONTHS, YOU WORRIED THAT YOUR FOOD WOULD RUN OUT BEFORE YOU GOT MONEY TO BUY MORE.: NEVER TRUE

## 2022-01-27 SDOH — ECONOMIC STABILITY: FOOD INSECURITY: WITHIN THE PAST 12 MONTHS, THE FOOD YOU BOUGHT JUST DIDN'T LAST AND YOU DIDN'T HAVE MONEY TO GET MORE.: NEVER TRUE

## 2022-01-27 ASSESSMENT — ENCOUNTER SYMPTOMS
ABDOMINAL PAIN: 0
COUGH: 0
CONSTIPATION: 0
SHORTNESS OF BREATH: 0
WHEEZING: 0
PHOTOPHOBIA: 0

## 2022-01-27 ASSESSMENT — SOCIAL DETERMINANTS OF HEALTH (SDOH): HOW HARD IS IT FOR YOU TO PAY FOR THE VERY BASICS LIKE FOOD, HOUSING, MEDICAL CARE, AND HEATING?: NOT HARD AT ALL

## 2022-01-27 NOTE — PATIENT INSTRUCTIONS
Return To Clinic 3/4/2022 . After Visit Summary  mailed to patient. It is very important for your care that you keep your appointment. If for some reason you are unable to keep your appointment it is equally important that you call our office at [phone] to cancel your appointment and reschedule.  Failure to do so may result in your termination from our practice.     -Bloodwork orders given to patient, they will have them done before their next visit.     -Justin Montiel

## 2022-01-27 NOTE — PROGRESS NOTES
Sarahi Salmon (:  1965) is a Established patient, here for evaluation of the following:    Assessment & Plan   Below is the assessment and plan developed based on review of pertinent history, physical exam, labs, studies, and medications. 1. Essential hypertension  -     losartan-hydroCHLOROthiazide (HYZAAR) 100-25 MG per tablet; Take 1 tablet by mouth daily Disp-30 tablet, R-5Normal  -     amLODIPine (NORVASC) 10 MG tablet; Take 1 tablet by mouth daily, Disp-30 tablet, R-5Normal  -     TSH with Reflex; Future  -     Comprehensive Metabolic Panel; Future  2. Bilateral edema of lower extremity  -     furosemide (LASIX) 20 MG tablet; Take 1 tablet by mouth daily, Disp-30 tablet, R-5Normal  3. Pre-diabetes  -     Hemoglobin A1C; Future  -     Microalbumin / Creatinine Urine Ratio; Future  -     Comprehensive Metabolic Panel; Future  4. Gastroesophageal reflux disease  -     omeprazole (PRILOSEC) 20 MG delayed release capsule; Take 1 capsule by mouth daily, Disp-30 capsule, R-5Normal  5. Bilateral carpal tunnel syndrome  6. Morbid obesity ([facility] [address]  7. MACHELLE (obstructive sleep apnea)  8. Screening for lipid disorders  -     Lipid Panel; Future    Return in about 6 weeks (around 3/10/2022). Subjective      HPI     Patient initiated a video visit via [address] for medication refills and for yearly check. She does need to come in to the office for blood pressure check. Overall she is doing well. She has no leg edema. She denies GERD symptoms. She takes Lasix as needed and takes omeprazole as needed. She has not been compliant with her CPAP. She says she has a CPAP but has been forgetting to use it. She denies any orthopnea or PND or apneic episodes at night while sleeping. Have encouraged her to restart using her CPAP on a regular basis. She needs labs done. Its been a year. She is denying any polyuria or polydipsia. She has prediabetes and hypertension.   She is complaining of bilateral hand numbness and pain. She wants to get cortisone shots in her wrist.  She says she has a history of carpal tunnel. She has been using wrist splints. Unfortunately right now she does not have insurance. She says she will wait for a referral to orthopedics. She did get both her Covid shots. 1 was done in May and the other one in June. She is overdue for her booster and have advised her to get the booster dose done and she will bring her vaccine card with her next time    Review of Systems   Constitutional: Negative for fatigue and unexpected weight change. Eyes: Negative for photophobia and visual disturbance. Respiratory: Negative for cough, shortness of breath and wheezing. Cardiovascular: Negative for chest pain, palpitations and leg swelling. Gastrointestinal: Negative for abdominal pain and constipation. Endocrine: Negative for polydipsia and polyuria. Genitourinary: Negative for dysuria and frequency. Neurological: Positive for numbness (b/l hand pain). Negative for dizziness, weakness and headaches. Hematological: Negative for adenopathy. Does not bruise/bleed easily.           Objective   Patient-Reported Vitals  Patient-Reported Systolic (Top): 578 mmHg  Patient-Reported Diastolic (Bottom): 93 mmHg  Patient-Reported Pulse: 79       Physical Exam  [INSTRUCTIONS:  \"[x]\" Indicates a positive item  \"[]\" Indicates a negative item  -- DELETE ALL ITEMS NOT EXAMINED]    Constitutional: [x] Appears well-developed and well-nourished [x] No apparent distress      [] Abnormal -     Mental status: [x] Alert and awake  [x] Oriented to person/place/time [x] Able to follow commands    [] Abnormal -     Eyes:   EOM    [x]  Normal    [] Abnormal -   Sclera  [x]  Normal    [] Abnormal -          Discharge [x]  None visible   [] Abnormal -     HENT: [x] Normocephalic, atraumatic  [] Abnormal -   [x] Mouth/Throat: Mucous membranes are moist    External Ears [x] Normal  [] Abnormal -    Neck: [x] No visualized mass [] Abnormal -     Pulmonary/Chest: [x] Respiratory effort normal   [x] No visualized signs of difficulty breathing or respiratory distress        [] Abnormal -      Musculoskeletal:   [x] Normal gait with no signs of ataxia         [x] Normal range of motion of neck        [] Abnormal -     Neurological:        [x] No Facial Asymmetry (Cranial nerve 7 motor function) (limited exam due to video visit)          [x] No gaze palsy        [] Abnormal -          Skin:        [x] No significant exanthematous lesions or discoloration noted on facial skin         [] Abnormal -            Psychiatric:       [x] Normal Affect [] Abnormal -        [x] No Hallucinations    Other pertinent observable physical exam findings:-             On this date 1/27/2022 I have spent 25 minutes reviewing previous notes, test results and face to face (virtual) with the patient discussing the diagnosis and importance of compliance with the treatment plan as well as documenting on the day of the visit. Emily Mullen, was evaluated through a synchronous (real-time) audio-video encounter. The patient (or guardian if applicable) is aware that this is a billable service, which includes applicable co-pays. This Virtual Visit was conducted with patient's (and/or legal guardian's) consent. The visit was conducted pursuant to the emergency declaration under the [address] [place] [address] authority and the [employer] and [employer] General Act. Patient identification was verified, and a caregiver was present when appropriate. The patient was located at home in a state where the provider was licensed to provide care.        --Guerrero Ruggiero MD

## 2022-02-23 ENCOUNTER — HOSPITAL ENCOUNTER (OUTPATIENT)
Age: 57
Setting detail: SPECIMEN
Discharge: HOME | End: 2022-02-23

## 2022-02-23 DIAGNOSIS — Z13.220 SCREENING FOR LIPID DISORDERS: ICD-10-CM

## 2022-02-23 DIAGNOSIS — I10 ESSENTIAL HYPERTENSION: ICD-10-CM

## 2022-02-23 DIAGNOSIS — R73.03 PRE-DIABETES: ICD-10-CM

## 2022-02-23 LAB
ALBUMIN SERPL-MCNC: 4.1 G/DL (ref 3.5–5.2)
ALBUMIN/GLOBULIN RATIO: 1.1 (ref 1–2.5)
ALP BLD-CCNC: 108 U/L (ref 35–104)
ALT SERPL-CCNC: 21 U/L (ref 5–33)
ANION GAP SERPL CALCULATED.3IONS-SCNC: 17 MMOL/L (ref 9–17)
AST SERPL-CCNC: 20 U/L
BILIRUB SERPL-MCNC: 0.7 MG/DL (ref 0.3–1.2)
BUN BLDV-MCNC: 14 MG/DL (ref 6–20)
CALCIUM SERPL-MCNC: 9.9 MG/DL (ref 8.6–10.4)
CHLORIDE BLD-SCNC: 102 MMOL/L (ref 98–107)
CHOLESTEROL/HDL RATIO: 5.3
CHOLESTEROL: 190 MG/DL
CO2: 23 MMOL/L (ref 20–31)
CREAT SERPL-MCNC: 0.76 MG/DL (ref 0.5–0.9)
CREATININE URINE: 174.8 MG/DL (ref 28–217)
ESTIMATED AVERAGE GLUCOSE: 146 MG/DL
GFR AFRICAN AMERICAN: >60 ML/MIN
GFR NON-AFRICAN AMERICAN: >60 ML/MIN
GFR SERPL CREATININE-BSD FRML MDRD: ABNORMAL ML/MIN/{1.73_M2}
GLUCOSE BLD-MCNC: 121 MG/DL (ref 70–99)
HBA1C MFR BLD: 6.7 % (ref 4–6)
HDLC SERPL-MCNC: 36 MG/DL
LDL CHOLESTEROL: 118 MG/DL (ref 0–130)
MICROALBUMIN/CREAT 24H UR: <12 MG/L
MICROALBUMIN/CREAT UR-RTO: NORMAL MCG/MG CREAT
POTASSIUM SERPL-SCNC: 4.3 MMOL/L (ref 3.7–5.3)
SODIUM BLD-SCNC: 142 MMOL/L (ref 135–144)
TOTAL PROTEIN: 8 G/DL (ref 6.4–8.3)
TRIGL SERPL-MCNC: 182 MG/DL
TSH SERPL DL<=0.05 MIU/L-ACNC: 3.49 MIU/L (ref 0.3–5)

## 2022-03-02 ENCOUNTER — TELEPHONE (OUTPATIENT)
Dept: INTERNAL MEDICINE | Age: 57
End: 2022-03-02

## 2022-03-02 NOTE — TELEPHONE ENCOUNTER
PC to patient - informed patient that Dr Maxim Clifton has no availability for March .  Patient added to wait list for appointment in April

## 2022-03-02 NOTE — TELEPHONE ENCOUNTER
----- Message from Carleen Beltre sent at 3/2/2022 12:01 PM EST -----  Subject: Appointment Request    Reason for Call: Routine Existing Condition Follow Up    QUESTIONS  Type of Appointment? Established Patient  Reason for appointment request? No appointments available during search  Additional Information for Provider? Pt. is currently out of town and   would like to reschedule the appt. for 3/4/22. Pt. states she will be back   on 3/8/22.   ---------------------------------------------------------------------------  --------------  CALL BACK INFO  What is the best way for the office to contact you? OK to leave message on   voicemail  Preferred Call Back Phone Number? [phone]  ---------------------------------------------------------------------------  --------------  SCRIPT ANSWERS  Relationship to Patient? Self  Have your symptoms changed? No  Is this follow up request related to routine Diabetes Management? No  Have you been diagnosed with, awaiting test results for, or told that you   are suspected of having COVID-19 (Coronavirus)? (If patient has tested   negative or was tested as a requirement for work, school, or travel and   not based on symptoms, answer no)? No  Within the past 10 days have you developed any of the following symptoms   (answer no if symptoms have been present longer than 10 days or began   more than 10 days ago)? Fever or Chills, Cough, Shortness of breath or   difficulty breathing, Loss of taste or smell, Sore throat, Nasal   congestion, Sneezing or runny nose, Fatigue or generalized body aches   (answer no if pain is specific to a body part e.g. back pain), Diarrhea,   Headache? No  Have you had close contact with someone with COVID-19 in the last 7 days? No  (Service Expert  click yes below to proceed with mobiDEOS As Usual   Scheduling)?  Yes

## 2022-05-05 ENCOUNTER — OFFICE VISIT (OUTPATIENT)
Dept: INTERNAL MEDICINE | Age: 57
End: 2022-05-05

## 2022-05-05 ENCOUNTER — TELEPHONE (OUTPATIENT)
Dept: INTERNAL MEDICINE | Age: 57
End: 2022-05-05

## 2022-05-05 VITALS
HEART RATE: 72 BPM | BODY MASS INDEX: 45.89 KG/M2 | TEMPERATURE: 97.5 F | DIASTOLIC BLOOD PRESSURE: 61 MMHG | WEIGHT: 259 LBS | SYSTOLIC BLOOD PRESSURE: 139 MMHG | HEIGHT: 63 IN | OXYGEN SATURATION: 99 %

## 2022-05-05 DIAGNOSIS — G47.33 OSA ON CPAP: ICD-10-CM

## 2022-05-05 DIAGNOSIS — Z99.89 OSA ON CPAP: ICD-10-CM

## 2022-05-05 DIAGNOSIS — E11.9 TYPE 2 DIABETES MELLITUS WITHOUT COMPLICATION, WITH LONG-TERM CURRENT USE OF INSULIN (HCC): Primary | ICD-10-CM

## 2022-05-05 DIAGNOSIS — Z79.4 TYPE 2 DIABETES MELLITUS WITHOUT COMPLICATION, WITH LONG-TERM CURRENT USE OF INSULIN (HCC): Primary | ICD-10-CM

## 2022-05-05 DIAGNOSIS — I10 ESSENTIAL HYPERTENSION: ICD-10-CM

## 2022-05-05 DIAGNOSIS — K21.9 GASTROESOPHAGEAL REFLUX DISEASE, UNSPECIFIED WHETHER ESOPHAGITIS PRESENT: ICD-10-CM

## 2022-05-05 DIAGNOSIS — E66.01 MORBID OBESITY (HCC): ICD-10-CM

## 2022-05-05 PROBLEM — R73.03 PRE-DIABETES: Status: RESOLVED | Noted: 2020-07-29 | Resolved: 2022-05-05

## 2022-05-05 PROCEDURE — 99213 OFFICE O/P EST LOW 20 MIN: CPT | Performed by: INTERNAL MEDICINE

## 2022-05-05 PROCEDURE — 3044F HG A1C LEVEL LT 7.0%: CPT | Performed by: INTERNAL MEDICINE

## 2022-05-05 RX ORDER — DULAGLUTIDE 0.75 MG/.5ML
0.75 INJECTION, SOLUTION SUBCUTANEOUS WEEKLY
Qty: 4 PEN | Refills: 3 | Status: SHIPPED | OUTPATIENT
Start: 2022-05-05 | End: 2022-09-21 | Stop reason: SDUPTHER

## 2022-05-05 ASSESSMENT — PATIENT HEALTH QUESTIONNAIRE - PHQ9
1. LITTLE INTEREST OR PLEASURE IN DOING THINGS: 0
2. FEELING DOWN, DEPRESSED OR HOPELESS: 0
SUM OF ALL RESPONSES TO PHQ QUESTIONS 1-9: 0
SUM OF ALL RESPONSES TO PHQ9 QUESTIONS 1 & 2: 0
SUM OF ALL RESPONSES TO PHQ QUESTIONS 1-9: 0

## 2022-05-05 ASSESSMENT — ENCOUNTER SYMPTOMS
PHOTOPHOBIA: 0
SHORTNESS OF BREATH: 0
COUGH: 0
WHEEZING: 0

## 2022-05-05 NOTE — PATIENT INSTRUCTIONS
Medications e-scribe to pharmacy of pt's choice. Printed script for Perscription signed and given to nurse. Referral to [facility] Diabetes Education was placed, summary of care printed and faxed to office, phone numbers given to the patient, they will contact office for an appt    Patient was put on a wait list and will be contacted to schedule their next follow up appointment once the schedule is available. If the patient is in need of an appointment before their next visit please call the office at [phone]. After Visit Summary  given and reviewed.

## 2022-05-05 NOTE — TELEPHONE ENCOUNTER
PC to pt to let her know writer has [employer] PAP application ready, requested pt to bring income verification and any denial letters she may have from [place] to assist in securing insurance for pt. Pt able to come in Monday with requested documents, sign PAP bhavana, and writer will also spend time reviewing how Trulicity pen functions.

## 2022-05-05 NOTE — PROGRESS NOTES
[facility]    Progress Note    Date of patient's visit: 2022    Patient's Name:  Kee Angeles    YOB: 1965            Patient Care Team:  Roberto Hill MD as PCP - General (Internal Medicine)  Roberto Hill MD as PCP - [facility]    REASON FOR VISIT: Routine outpatient follow     Chief Complaint   Patient presents with    Hypertension     pt stated she took medication this morning    Weight Loss         HISTORY OF PRESENT ILLNESS:    History was obtained from the patient. Kee Angeles is a 59 y.o. is here for follow-up. She has history of hypertension. She is taking all her medications. She also has history of leg edema. She could not get echo done as she does not have insurance. She is feeling much better. She denies any edema. She has sleep apnea. She says she has been using her CPAP regularly. Her main concern is her recent tests which show diabetes type 2 and she is concerned about weight weight gain and wants to discuss options regarding weight loss. She has gained almost 10 pounds in the last year. She cannot exercise or walk too much because of severe plantar fasciitis in both feet. She is interested in medications as well. Discussed GLP-1 and metformin. In the past metformin has caused diarrhea but she is willing to try it again. she has no insurance. We will see if there is a patient assistance program that can help her with GLP-1 agonist.  Side effects were discussed with her. Patient is willing to try. There is no personal or family history of thyroid medullary cancer or men 2.       Past Medical History:   Diagnosis Date    Hyperlipidemia     Hypertension     Obesity        Past Surgical History:   Procedure Laterality Date     SECTION      CHOLECYSTECTOMY      TUBAL LIGATION Bilateral     at time of csection         ALLERGIES      Allergies   Allergen Reactions    Codeine     Dicloxacillin     Pcn [Penicillins]        MEDICATIONS:      Current Outpatient Medications on File Prior to Visit   Medication Sig Dispense Refill    losartan-hydroCHLOROthiazide (HYZAAR) 100-25 MG per tablet Take 1 tablet by mouth daily \ 30 tablet 5    omeprazole (PRILOSEC) 20 MG delayed release capsule Take 1 capsule by mouth daily 30 capsule 5    amLODIPine (NORVASC) 10 MG tablet Take 1 tablet by mouth daily 30 tablet 5    furosemide (LASIX) 20 MG tablet Take 1 tablet by mouth daily 30 tablet 5    nystatin (MYCOSTATIN) 862286 UNIT/GM cream Apply topically daily as needed for Dry Skin 1 Tube 5     No current facility-administered medications on file prior to visit. HISTORY    Reviewed and no change from previous record. Trip Larkin  reports that she has never smoked. She has never used smokeless tobacco.    FAMILY HISTORY:    Reviewed and No change from previous visit    HEALTH MAINTENANCE DUE:      Health Maintenance Due   Topic Date Due    A1C test (Diabetic or Prediabetic)  05/23/2022       REVIEW OF SYSTEMS:    12 point review of symptoms completed and found to be normal except noted in the HPI    Review of Systems   Constitutional: Negative. Negative for fatigue. Eyes: Negative for photophobia and visual disturbance. Respiratory: Negative for cough, shortness of breath and wheezing. Cardiovascular: Negative for chest pain, palpitations and leg swelling. Endocrine: Negative for polydipsia and polyuria. Neurological: Negative for dizziness, weakness, numbness and headaches. Hematological: Negative for adenopathy. Does not bruise/bleed easily. PHYSICAL EXAM:     Vitals:    05/05/22 0836 05/05/22 0844   BP: (!) 155/66 139/61   Site: Left Upper Arm Left Upper Arm   Position: Sitting Sitting   Cuff Size: Large Adult Large Adult   Pulse: 73 72   Temp: 97.5 °F (36.4 °C)    SpO2: 99%    Weight: 259 lb (117.5 kg)    Height: 5' 3\" (1.6 m)      Body mass index is 45.88 kg/m².     BP Readings from Last 3 Encounters:   05/05/22 139/61   08/12/21 138/72   02/12/21 130/77        Wt Readings from Last 3 Encounters:   05/05/22 259 lb (117.5 kg)   08/12/21 256 lb (116.1 kg)   02/12/21 254 lb (115.2 kg)       Physical Exam  Vitals and nursing note reviewed. Constitutional:       Appearance: Normal appearance. She is obese. HENT:      Head: Normocephalic and atraumatic. Eyes:      Extraocular Movements: Extraocular movements intact. Conjunctiva/sclera: Conjunctivae normal.      Pupils: Pupils are equal, round, and reactive to light. Cardiovascular:      Rate and Rhythm: Normal rate and regular rhythm. Heart sounds: No murmur heard. Pulmonary:      Effort: Pulmonary effort is normal.      Breath sounds: Normal breath sounds. No wheezing. Musculoskeletal:      Right lower leg: No edema. Left lower leg: No edema. Neurological:      General: No focal deficit present. Mental Status: She is alert and oriented to person, place, and time.              LABORATORY FINDINGS:    CBC:  Lab Results   Component Value Date    WBC 7.7 03/01/2019    HGB 12.2 03/01/2019     03/01/2019     04/29/2012     BMP:    Lab Results   Component Value Date     02/23/2022    K 4.3 02/23/2022     02/23/2022    CO2 23 02/23/2022    BUN 14 02/23/2022    CREATININE 0.76 02/23/2022    GLUCOSE 121 02/23/2022    GLUCOSE 119 04/29/2012     HEMOGLOBIN A1C:   Lab Results   Component Value Date    LABA1C 6.7 02/23/2022     MICROALBUMIN URINE:   Lab Results   Component Value Date    MICROALBUR <12 02/23/2022     FASTING LIPID PANEL:  Lab Results   Component Value Date    CHOL 190 02/23/2022    HDL 36 (L) 02/23/2022    TRIG 182 (H) 02/23/2022     Lab Results   Component Value Date    LDLCHOLESTEROL 118 02/23/2022       LIVER PROFILE:  Lab Results   Component Value Date    ALT 21 02/23/2022    AST 20 02/23/2022    PROT 8.0 02/23/2022    BILITOT 0.70 02/23/2022    BILIDIR 0.10 08/08/2016 LABALBU 4.1 02/23/2022      THYROID FUNCTION:   Lab Results   Component Value Date    TSH 3.49 02/23/2022      URINEANALYSIS: No results found for: LABURIN  ASSESSMENT AND PLAN:    1. Type 2 diabetes mellitus without complication, with long-term current use of insulin ([address]    - [facility]  - metFORMIN (GLUCOPHAGE) 500 MG tablet; Take 1 tablet by mouth 2 times daily (with meals)  Dispense: 60 tablet; Refill: 5  - Dulaglutide (TRULICITY) 5.71 BQ/7.2CM SOPN; Inject 0.75 mg into the skin once a week  Dispense: 4 pen; Refill: 3  - [facility] Referral for Social Determinants of Health    2. Essential hypertension  same meds    3. MACHELLE on CPAP  She is compliant     4. Morbid obesity (HCC)    - Dulaglutide (TRULICITY) 8.31 AA/3.6YR SOPN; Inject 0.75 mg into the skin once a week  Dispense: 4 pen; Refill: 3    5. Gastroesophageal reflux disease, unspecified whether esophagitis present  PPI as needed           FOLLOW UP AND INSTRUCTIONS:   Return in about 3 months (around 8/5/2022). 1. Roxie received counseling on the following healthy behaviors: nutrition, exercise and medication adherence    2. Reviewed prior labs and health maintenance. 3. Discussed use, benefit, and side effects of prescribed medications. Barriers to medication compliance addressed. All patient questions answered. Pt voiced understanding.      4. Patient given educational materials - see patient instructions    Earnest Hubbard  Attending Physician, [facility]   Faculty, Internal Medicine Residency Program  [facility]  5/5/2022, 9:02 AM

## 2022-05-06 ENCOUNTER — TELEPHONE (OUTPATIENT)
Dept: FAMILY MEDICINE CLINIC | Age: 57
End: 2022-05-06

## 2022-05-06 NOTE — TELEPHONE ENCOUNTER
Jacqueline Oneil was contacted  by writer to discuss referral for SDOH related needs. Writer spoke with: Patient and explained the services and assistance that can be provided through the patient navigation program.     Patient agreeable to receiving resources and support from [address] [place] [address]. Discussed the following with the patient:      Needs and background info: Pt was denied medicaid. She just found an email stating she was denied because she did not complete the process. The email is about a month old. She had attempted to do her intake interview and was on hold for about three hours before the phone automatically hung up. Plan of Care: pt plans to go to [facility] to reapply for medicaid in person. CHW advised pt to call first thing in the morning for the best chance of getting through to [address] [place] [address] Road for an interview and to call for assistance if she has any issues. Follow up with patient necessary: no    Follow up with resource organization necessary: no    Patient has given verbal permission to leave detailed messages regarding SDOH referral on their phone. N/A    Patient has given verbal permission to submit applications on their behalf. N/A    Patient was provided with writer's contact information should they require any additional assistance.        Alayna

## 2022-05-09 ENCOUNTER — NURSE ONLY (OUTPATIENT)
Dept: INTERNAL MEDICINE | Age: 57
End: 2022-05-09

## 2022-05-09 DIAGNOSIS — Z79.4 TYPE 2 DIABETES MELLITUS WITHOUT COMPLICATION, WITH LONG-TERM CURRENT USE OF INSULIN (HCC): Primary | ICD-10-CM

## 2022-05-09 DIAGNOSIS — E11.9 TYPE 2 DIABETES MELLITUS WITHOUT COMPLICATION, WITH LONG-TERM CURRENT USE OF INSULIN (HCC): Primary | ICD-10-CM

## 2022-05-09 PROCEDURE — 99999 PR OFFICE/OUTPT VISIT,PROCEDURE ONLY: CPT | Performed by: INTERNAL MEDICINE

## 2022-05-09 NOTE — PROGRESS NOTES
Pt here to sign PAP for [employer] bhavana for Trulicity 5.66 UM/6.1 mL. Pt has no insurance currently, was denied Medicaid for missing an interview. Pt is working with [place] [employer] to re-apply for Medicaid. Pt provided proof of income, will scan fax conf, application, and proof of income into media once fax confirmation received. Pt was given one box of sample medication, Trulicity 4.43 HX/8.2 mL. There are 2 pens in box, pt self-administered first shot after education was given on use of pen. Pt tolerated shot well, has pen for next Monday. Pt denies questions at this time.

## 2022-05-12 ENCOUNTER — TELEPHONE (OUTPATIENT)
Dept: FAMILY MEDICINE CLINIC | Age: 57
End: 2022-05-12

## 2022-05-12 NOTE — TELEPHONE ENCOUNTER
Jaden Lopez was contacted by dayo Pastrana, regarding a Social Determinants of Health referral.     A message was left with the writer's contact information.     First contact attempt for f/u

## 2022-05-16 ENCOUNTER — TELEPHONE (OUTPATIENT)
Dept: FAMILY MEDICINE CLINIC | Age: 57
End: 2022-05-16

## 2022-05-16 NOTE — TELEPHONE ENCOUNTER
Mckinley Mancia was contacted by Halley Baez, dayo Remy, regarding a Social Determinants of Health referral.     A message was left with the writer's contact information.     Second contact attempt for f/u

## 2022-05-19 ENCOUNTER — TELEPHONE (OUTPATIENT)
Dept: INTERNAL MEDICINE | Age: 57
End: 2022-05-19

## 2022-05-19 NOTE — TELEPHONE ENCOUNTER
PC to pt first to see if she has heard from [employer] re: application. She states she just received confirmation that she has been enrolled into the PAP for Trulicity, pt was told medication should reach her this Friday (tomorrow). Note added to care coordination note in chart, pt should be covered through end of 2022 for medication, will need to reapply at that time. Writer asked if pt has appealed the denial of Medicaid, pt states she just got back into town, has been in [place] visiting family since we last spoke.  Pt states she will follow up with Medicaid application/denial appeal.

## 2022-07-19 ENCOUNTER — TELEPHONE (OUTPATIENT)
Dept: INTERNAL MEDICINE | Age: 57
End: 2022-07-19

## 2022-07-19 NOTE — TELEPHONE ENCOUNTER
Dr. Jake Miguel, pt is c/o continued diarrhea with the metformin. She stayed on it for several weeks hoping it would resolve, but it has not. Pt then started taking it once a day hoping that would help, but at this time pt is not wanting to continue the metformin d/t side effects. Pt is still taking the Trulicity 4.80 weekly, receiving through [employer] PAP. Pt states she has left over diabetic testing supplies from her mother recently passing away. I asked her to check her fasting blood sugars in the morning and I will touch base with her in a couple days to see what her numbers are.

## 2022-07-19 NOTE — TELEPHONE ENCOUNTER
PC from pt stating she received a letter in the mail from [employer] [facility] re: Trulicity. The letter indicates the healthcare portion of the application is missing the provider prescription for the requested medication. Phone number [phone] on the letter, NETTIE [phone]. Pt also complaining that the metformin has continued to cause diarrhea for her. Pt states she started taking the metformin only once a day hoping that would resolve the diarrhea, but it hasn't. Pt is not inclined to continue the medication. Pt states she does not check her blood sugar at home, never has and was not aware it was something she should be doing. Pt does not have insurance, which would make getting testing supplies very expensive for her and she is on a fixed income. Pt states her mother was a diabetic and recently passed away, pt states there are testing supplies left over. Writer requested pt check her fasting blood sugar for the next few days so that information can be passed on to Dr. Gordon Barrett. PC to [employer] at number above. Spoke with Marlon Orozco, she states there was missing information on the application back in May, but it was resolved at that time. There was a missing date on the original application, Estrellita Giles had added missing date and faxed back. There is no problem currently with the pt's assistance.

## 2022-07-21 NOTE — TELEPHONE ENCOUNTER
Spoke with pt re: blood sugars, reports the followin/20/22  109 before bed   22  105 at 0730 this morning--fasting    Pt states last dose metformin was about a week ago. Writer advised pt to check again before bed tonight and fasting blood sugar again in the morning. Spoke with Dr. Fernanda Lomas, she states pt was started on Trulicity more for weight loss, blood sugars are good right now. PC back to pt to let her know to continue checking her fasting blood sugar in the mornings. Pt advised to call office if FBS>140 2+ days in a row. Also discussed testing supplies, pt is using her mother's old glucometer and will be needing lancets and strips. Pt to check glucometer and testing supplies for brand information, will get script sent to pharmacy for refills. Addendum 1534: Received VM from pt stating the test strips she has are [employer]. Pt does not have insurance at the time, is in the process of re-applying. Test strips are around $25 for a box of 35 strips, this will most likely be cheapest option for pt, but there is a savings card pt could possibly use. PC to pt, no answer--phone is off went straight to VM. Will try pt in morning to review blood sugars and discuss options with strips/lancets.

## 2022-08-03 ENCOUNTER — TELEPHONE (OUTPATIENT)
Dept: INTERNAL MEDICINE | Age: 57
End: 2022-08-03

## 2022-08-03 NOTE — TELEPHONE ENCOUNTER
Pt states she found a pack of testing strips at [facility] for $15 states there's 30 or 35 in the pack and that is the least expensive she has been able to find. Pt is on prescription program with [facility], pt called to see if she had a script for the testing strips if they would be even cheaper using the program.     Pt called writer back, [facility] unable to tell her how much it would be, advised pt to check her bhavana for her savings program. Pt states strips appear to be ~$50-60. Plan is for pt to purchase strips at [facility]. Pt states her FBS have been 105-109, pt advised to continue monitoring FBS 1-2 times/week to watch for FBS elevation and to also conserve strips.

## 2022-08-08 DIAGNOSIS — E66.01 CLASS 3 SEVERE OBESITY DUE TO EXCESS CALORIES WITH BODY MASS INDEX (BMI) OF 45.0 TO 49.9 IN ADULT, UNSPECIFIED WHETHER SERIOUS COMORBIDITY PRESENT (HCC): Primary | ICD-10-CM

## 2022-08-24 DIAGNOSIS — R60.0 BILATERAL EDEMA OF LOWER EXTREMITY: ICD-10-CM

## 2022-08-26 RX ORDER — FUROSEMIDE 20 MG/1
TABLET ORAL
Qty: 30 TABLET | Refills: 5 | Status: SHIPPED | OUTPATIENT
Start: 2022-08-26

## 2022-08-26 NOTE — TELEPHONE ENCOUNTER
E-scribing request for LASIX pt has no future appt. Last seen 5/5/22. Please advise. Health Maintenance   Topic Date Due    Pneumococcal 0-64 years Vaccine (1 - PCV) Never done    Diabetic foot exam  Never done    Diabetic retinal exam  Never done    Hepatitis B vaccine (1 of 3 - Risk 3-dose series) Never done    COVID-19 Vaccine (3 - Booster for Pfizer series) 06/27/2022    Breast cancer screen  07/14/2022    DTaP/Tdap/Td vaccine (1 - Tdap) 01/27/2023 (Originally 3/6/1984)    Shingles vaccine (1 of 2) 01/27/2023 (Originally 3/6/2013)    Flu vaccine (1) 09/01/2022    A1C test (Diabetic or Prediabetic)  02/23/2023    Diabetic microalbuminuria test  02/23/2023    Lipids  02/23/2023    Depression Screen  05/05/2023    Cervical cancer screen  08/12/2026    Colorectal Cancer Screen  10/27/2026    Hepatitis C screen  Completed    HIV screen  Completed    Hepatitis A vaccine  Aged Out    Hib vaccine  Aged Out    Meningococcal (ACWY) vaccine  Aged Out             (applicable per patient's age: Cancer Screenings, Depression Screening, Fall Risk Screening, Immunizations)    Hemoglobin A1C (%)   Date Value   02/23/2022 6.7 (H)   02/12/2021 6.4   02/20/2020 6.3     Microalb/Crt. Ratio (mcg/mg creat)   Date Value   02/23/2022 Can not be calculated     LDL Cholesterol (mg/dL)   Date Value   02/23/2022 118     AST (U/L)   Date Value   02/23/2022 20     ALT (U/L)   Date Value   02/23/2022 21     BUN (mg/dL)   Date Value   02/23/2022 14      (goal A1C is < 7)   (goal LDL is <100) need 30-50% reduction from baseline     BP Readings from Last 3 Encounters:   05/05/22 139/61   08/12/21 138/72   02/12/21 130/77    (goal /80)      All Future Testing planned in [facility]:  Lab Frequency Next Occurrence       Next Visit Date:  No future appointments.          Patient Active Problem List:     HTN (hypertension)     Anemia     Obesity     Gout     MACHELLE on CPAP     Cervical high risk HPV (human papillomavirus) test positive     Type 2 diabetes mellitus without complication, with long-term current use of insulin ([facility] [address]

## 2022-08-30 NOTE — TELEPHONE ENCOUNTER
PC to pt-- LM to contact office to schedule appt     Pt needs to schedule follow up appt and handicap placard can be discussed at that time

## 2022-08-30 NOTE — TELEPHONE ENCOUNTER
Patient returning a call. Patient was informed that she needs an appt before a handicap placard can be given to her.   Patient scheduled an appt on 9/21/22

## 2022-09-21 ENCOUNTER — OFFICE VISIT (OUTPATIENT)
Dept: INTERNAL MEDICINE | Age: 57
End: 2022-09-21

## 2022-09-21 VITALS
DIASTOLIC BLOOD PRESSURE: 79 MMHG | SYSTOLIC BLOOD PRESSURE: 127 MMHG | OXYGEN SATURATION: 99 % | HEART RATE: 71 BPM | TEMPERATURE: 98.4 F | WEIGHT: 251 LBS | BODY MASS INDEX: 44.46 KG/M2

## 2022-09-21 DIAGNOSIS — I10 PRIMARY HYPERTENSION: ICD-10-CM

## 2022-09-21 DIAGNOSIS — G47.33 OSA ON CPAP: ICD-10-CM

## 2022-09-21 DIAGNOSIS — E66.01 MORBID OBESITY (HCC): ICD-10-CM

## 2022-09-21 DIAGNOSIS — Z79.4 TYPE 2 DIABETES MELLITUS WITHOUT COMPLICATION, WITH LONG-TERM CURRENT USE OF INSULIN (HCC): Primary | ICD-10-CM

## 2022-09-21 DIAGNOSIS — E11.9 TYPE 2 DIABETES MELLITUS WITHOUT COMPLICATION, WITH LONG-TERM CURRENT USE OF INSULIN (HCC): Primary | ICD-10-CM

## 2022-09-21 DIAGNOSIS — Z99.89 OSA ON CPAP: ICD-10-CM

## 2022-09-21 LAB — HBA1C MFR BLD: 5.9 %

## 2022-09-21 PROCEDURE — 83036 HEMOGLOBIN GLYCOSYLATED A1C: CPT | Performed by: INTERNAL MEDICINE

## 2022-09-21 PROCEDURE — 99211 OFF/OP EST MAY X REQ PHY/QHP: CPT | Performed by: INTERNAL MEDICINE

## 2022-09-21 PROCEDURE — 99213 OFFICE O/P EST LOW 20 MIN: CPT | Performed by: INTERNAL MEDICINE

## 2022-09-21 PROCEDURE — 3044F HG A1C LEVEL LT 7.0%: CPT | Performed by: INTERNAL MEDICINE

## 2022-09-21 RX ORDER — DULAGLUTIDE 0.75 MG/.5ML
0.75 INJECTION, SOLUTION SUBCUTANEOUS WEEKLY
Qty: 4 ADJUSTABLE DOSE PRE-FILLED PEN SYRINGE | Refills: 5 | Status: SHIPPED | OUTPATIENT
Start: 2022-09-21

## 2022-09-21 RX ORDER — LOSARTAN POTASSIUM AND HYDROCHLOROTHIAZIDE 25; 100 MG/1; MG/1
1 TABLET ORAL DAILY
Qty: 90 TABLET | Refills: 1 | Status: SHIPPED | OUTPATIENT
Start: 2022-09-21

## 2022-09-21 RX ORDER — AMLODIPINE BESYLATE 10 MG/1
10 TABLET ORAL DAILY
Qty: 90 TABLET | Refills: 1 | Status: SHIPPED | OUTPATIENT
Start: 2022-09-21

## 2022-09-21 ASSESSMENT — ENCOUNTER SYMPTOMS
COUGH: 0
WHEEZING: 0
PHOTOPHOBIA: 0
SHORTNESS OF BREATH: 0

## 2022-09-21 NOTE — PROGRESS NOTES
[facility]    Progress Note    Date of patient's visit: 2022    Patient's Name:  Mohinder Nix    YOB: 1965            Patient Care Team:  Chano Kiser MD as PCP - General (Internal Medicine)  Chano Kiser MD as PCP - [facility] Empaneled Provider    REASON FOR VISIT: Routine outpatient follow     Chief Complaint   Patient presents with    Diabetes    Hypertension    Health Maintenance     Vaccines declined, Will schedule foot and eye exam,          HISTORY OF PRESENT ILLNESS:    History was obtained from the patient. Mohinder Nix is a 59 y.o. is here for follow-up on her chronic medical problems. She has started Trulicity with her diagnosis of diabetes and morbid obesity and is feeling much better. She has lost some weight. She has changed her diet and she has been doing water aerobics which has helped. She denies any chest pain or shortness of breath or leg edema. She would like to continue her current medications. She has noticed some weight loss and her A1c today is better at 5.9. She has also been compliant with her CPAP and she says she is using it daily. She does need a new mask and tubing. No other concerns today. She does not want a flu vaccine as she had a bad reaction to it several years ago.       Results for POC orders placed in visit on 22   POCT glycosylated hemoglobin (Hb A1C)   Result Value Ref Range    Hemoglobin A1C 5.9 %               Past Medical History:   Diagnosis Date    Hyperlipidemia     Hypertension     Obesity        Past Surgical History:   Procedure Laterality Date     SECTION      CHOLECYSTECTOMY      TUBAL LIGATION Bilateral     at time of csection         ALLERGIES      Allergies   Allergen Reactions    Codeine     Dicloxacillin     Pcn [Penicillins]        MEDICATIONS:      Current Outpatient Medications on File Prior to Visit   Medication Sig Dispense Refill    furosemide (LASIX) 20 MG tablet TAKE ONE TABLET BY MOUTH DAILY 30 tablet 5    Dulaglutide (TRULICITY) 6.24 WZ/4.0XV SOPN Inject 0.75 mg into the skin once a week 4 pen 3    losartan-hydroCHLOROthiazide (HYZAAR) 100-25 MG per tablet Take 1 tablet by mouth daily \ 30 tablet 5    omeprazole (PRILOSEC) 20 MG delayed release capsule Take 1 capsule by mouth daily 30 capsule 5    amLODIPine (NORVASC) 10 MG tablet Take 1 tablet by mouth daily 30 tablet 5    nystatin (MYCOSTATIN) 005480 UNIT/GM cream Apply topically daily as needed for Dry Skin 1 Tube 5     No current facility-administered medications on file prior to visit. HISTORY    Reviewed and no change from previous record. Job Pily  reports that she has never smoked. She has never used smokeless tobacco.    FAMILY HISTORY:    Reviewed and No change from previous visit    HEALTH MAINTENANCE DUE:      Health Maintenance Due   Topic Date Due    Pneumococcal 0-64 years Vaccine (1 - PCV) Never done    Diabetic foot exam  Never done    Diabetic retinal exam  Never done    Hepatitis B vaccine (1 of 3 - Risk 3-dose series) Never done    COVID-19 Vaccine (4 - Booster for Pfizer series) 05/27/2022    Breast cancer screen  07/14/2022    Flu vaccine (1) Never done       REVIEW OF SYSTEMS:    12 point review of symptoms completed and found to be normal except noted in the HPI    Review of Systems   Constitutional:  Negative for chills, fatigue and fever. Eyes:  Negative for photophobia and visual disturbance. Respiratory:  Negative for cough, shortness of breath and wheezing. Cardiovascular:  Negative for chest pain, palpitations and leg swelling. Endocrine: Negative for polydipsia and polyuria. Neurological:  Negative for dizziness, syncope, weakness, numbness and headaches. Hematological:  Negative for adenopathy. Does not bruise/bleed easily.       PHYSICAL EXAM:     Vitals:    09/21/22 1302   BP: 127/79   Site: Left Upper Arm   Position: Sitting   Cuff Size: Large Adult   Pulse: 71 Temp: 98.4 °F (36.9 °C)   TempSrc: Infrared   SpO2: 99%   Weight: 251 lb (113.9 kg)     Body mass index is 44.46 kg/m². BP Readings from Last 3 Encounters:   09/21/22 127/79   05/05/22 139/61   08/12/21 138/72        Wt Readings from Last 3 Encounters:   09/21/22 251 lb (113.9 kg)   05/05/22 259 lb (117.5 kg)   08/12/21 256 lb (116.1 kg)       Physical Exam  Vitals and nursing note reviewed. Constitutional:       Appearance: Normal appearance. She is obese. HENT:      Head: Normocephalic and atraumatic. Eyes:      Extraocular Movements: Extraocular movements intact. Conjunctiva/sclera: Conjunctivae normal.      Pupils: Pupils are equal, round, and reactive to light. Cardiovascular:      Rate and Rhythm: Normal rate and regular rhythm. Heart sounds: Murmur (soft systolic murmur 3/6 left chest) heard. Pulmonary:      Effort: Pulmonary effort is normal.      Breath sounds: Normal breath sounds. No wheezing. Musculoskeletal:      Cervical back: Normal range of motion and neck supple. Right lower leg: No edema. Left lower leg: No edema. Lymphadenopathy:      Cervical: No cervical adenopathy. Neurological:      General: No focal deficit present. Mental Status: She is alert and oriented to person, place, and time.              LABORATORY FINDINGS:    CBC:  Lab Results   Component Value Date/Time    WBC 7.7 03/01/2019 02:54 PM    HGB 12.2 03/01/2019 02:54 PM     03/01/2019 02:54 PM     04/29/2012 10:27 PM     BMP:    Lab Results   Component Value Date/Time     02/23/2022 11:50 AM    K 4.3 02/23/2022 11:50 AM     02/23/2022 11:50 AM    CO2 23 02/23/2022 11:50 AM    BUN 14 02/23/2022 11:50 AM    CREATININE 0.76 02/23/2022 11:50 AM    GLUCOSE 121 02/23/2022 11:50 AM    GLUCOSE 119 04/29/2012 10:27 PM     HEMOGLOBIN A1C:   Lab Results   Component Value Date/Time    LABA1C 5.9 09/21/2022 01:11 PM     MICROALBUMIN URINE:   Lab Results   Component Value Date/Time    MICROALBUR <12 02/23/2022 03:39 PM     FASTING LIPID PANEL:  Lab Results   Component Value Date    CHOL 190 02/23/2022    HDL 36 (L) 02/23/2022    TRIG 182 (H) 02/23/2022     Lab Results   Component Value Date    LDLCHOLESTEROL 118 02/23/2022       LIVER PROFILE:  Lab Results   Component Value Date/Time    ALT 21 02/23/2022 11:50 AM    AST 20 02/23/2022 11:50 AM    PROT 8.0 02/23/2022 11:50 AM    BILITOT 0.70 02/23/2022 11:50 AM    BILIDIR 0.10 08/08/2016 04:43 PM    LABALBU 4.1 02/23/2022 11:50 AM      THYROID FUNCTION:   Lab Results   Component Value Date/Time    TSH 3.49 02/23/2022 11:50 AM      URINEANALYSIS: No results found for: LABURIN  ASSESSMENT AND PLAN:    1. Type 2 diabetes mellitus without complication, with long-term current use of insulin (HCC)  Eye exam  Pneumovax recommended    - POCT glycosylated hemoglobin (Hb A1C)  - Dulaglutide (TRULICITY) 8.92 NP/9.4AU SOPN; Inject 0.75 mg into the skin once a week  Dispense: 4 Adjustable Dose Pre-filled Pen Syringe; Refill: 5    2. Primary hypertension    - losartan-hydroCHLOROthiazide (HYZAAR) 100-25 MG per tablet; Take 1 tablet by mouth daily  Dispense: 90 tablet; Refill: 1  - amLODIPine (NORVASC) 10 MG tablet; Take 1 tablet by mouth daily  Dispense: 90 tablet; Refill: 1    3. MACHELLE on CPAP    - DME Order for CPAP as OP      4. Morbid obesity (HCC)    - Dulaglutide (TRULICITY) 7.67 IV/4.3ZP SOPN; Inject 0.75 mg into the skin once a week  Dispense: 4 Adjustable Dose Pre-filled Pen Syringe; Refill: 5          FOLLOW UP AND INSTRUCTIONS:   Return in about 6 months (around 3/21/2023). Roxie received counseling on the following healthy behaviors: nutrition, exercise, and medication adherence    Reviewed prior labs and health maintenance. Discussed use, benefit, and side effects of prescribed medications. Barriers to medication compliance addressed. All patient questions answered. Pt voiced understanding.      Patient given educational materials - see patient instructions    Bunny Chowdhury  Attending Physician, [address] [place] [address], Internal Medicine Residency Program  [facility] ([facility]) Physician - [facility]  9/21/2022, 1:11 PM

## 2022-09-21 NOTE — PATIENT INSTRUCTIONS
-Pt due for 6 month f/u in march-- pt to call in february to set up an appt--reminder in EPIC to contact patient as well--AVS given to patient     -RADHA Mills

## 2022-10-03 ENCOUNTER — TELEPHONE (OUTPATIENT)
Dept: INTERNAL MEDICINE | Age: 57
End: 2022-10-03

## 2022-10-03 DIAGNOSIS — E66.01 CLASS 3 SEVERE OBESITY DUE TO EXCESS CALORIES WITH BODY MASS INDEX (BMI) OF 45.0 TO 49.9 IN ADULT, UNSPECIFIED WHETHER SERIOUS COMORBIDITY PRESENT (HCC): Primary | ICD-10-CM

## 2022-10-03 NOTE — TELEPHONE ENCOUNTER
She does not qualify for Handicap placard. She is ambulatory and does not use any assistive devices.

## 2022-10-03 NOTE — TELEPHONE ENCOUNTER
Dr. Sadiq Buenrostro, pt was here 9/21, she called this afternoon and states she was supposed to have gotten a script for a handicap placard when she was here. I do not see any notes re: placard, I have pended a script, please review.

## 2022-11-29 ENCOUNTER — TELEPHONE (OUTPATIENT)
Dept: INTERNAL MEDICINE | Age: 57
End: 2022-11-29

## 2022-11-29 NOTE — LETTER
[facility]  [address] [facility] [address]  Phone: [phone]  Fax: [phone]    Dr. Hanny Wild MD      November 29, 2022    [address] [place] [address] [address] [facility]      To Whom It May Concern:    Mario Kerns would greatly benefit from a 2-bedroom apartment. Kelby Flores has exercise equipment that she is using to reduce her weight and help control chronic health conditions. In order to continue her progress towards her health goals, she needs this exercise equipment easily accessible to her. A second bedroom would be sufficient space for [facility] to store this necessary equipment. If you have any questions or concerns, please don't hesitate to call.     Sincerely,        Dr. Hanny Wild MD

## 2022-11-29 NOTE — Clinical Note
ASIYA [address]  [address] [facility] 93 [address]  Phone: [phone]  Fax: [phone]    Faye Dias RN        December 2, 2022     Patient: Lynnette Whitley   YOB: 1965   Date of Visit: 11/29/2022       To Whom It May Concern: It is my medical opinion that Inez Apa {participation release, (activity restriction):21264}. If you have any questions or concerns, please don't hesitate to call.     Sincerely,        Faye Dias RN

## 2022-11-29 NOTE — LETTER
[facility]  [facility] [address] [facility]  [address] [facility] [address]  Dept: 500 Shikha Mchugh MD        12/2/2022    [address] [place], [place] [id number]      Dear Angela Singer: This letter is a reminder that you may have diagnostic testing that has not been completed. It is important to your well-being that these test(s) are performed. Please call our office at Dept: [phone] for additional information on the outstanding tests or let us know if they have been completed so we may update your chart.     Sincerely,        Sia Maravilla MD

## 2022-11-29 NOTE — Clinical Note
ASIYA [address]  [address] [facility] [address] [address]  Phone: [phone]  Fax: [phone]    Rasheed Sears RN         December 2, 2022     Patient: Mary Solis   YOB: 1965   Date of Visit: 11/29/2022       To Whom It May Concern: It is my medical opinion that Eliel Barajas requires a disability parking placard for the following reasons:  {reasons:73660}  Duration of need: {time:74221}    If you have any questions or concerns, please don't hesitate to call.     Sincerely,        Rasheed Sears RN

## 2022-11-29 NOTE — Clinical Note
ASIYA [address]  [address] [address]  Phone: [phone]  Fax: [phone]    Alvaro Chauhan RN        December 2, 2022    [address] [place] [address]      Dear Dmitry Beavers: The results of your most recent Pap smear are normal. This means that no cancerous or precancerous cells were seen. We recommend that you come back in {1-3:25377::1} {:90951::year} for your next routine Pap smear. If you have any questions or concerns, please don't hesitate to call.     Sincerely,        Alvaro Chauhan RN

## 2022-11-29 NOTE — Clinical Note
ASIYA [place] [address]  [address] [address]  Phone: [phone]  Fax: [phone]    Haroldo Moss RN        December 2, 2022     [address] [place] [address]      Dear Carlton Padilla:    Below are the results from your recent visit:    No results found from the In Basket message. The test results show that your current treatment is working. Please {:95905}. We recommend that you repeat the above test(s) in {:71248} {:11}. If you have any questions or concerns, please don't hesitate to call.     Sincerely,        Haroldo Moss RN

## 2022-11-29 NOTE — LETTER
ASIYA [address]  [address] [address]  Phone: [phone]  Fax: [phone]    Ghanshyam Espino RN        December 2, 2022     [address] [place] [address]      Dear Park Thibodeaux: We missed seeing you for a scheduled appointment at [address] [place] [address] with Ghanshyam Espino RN on 12/2/2022. We're sorry you were unable to keep your appointment and hope that you are doing well. We ask that you please call 24 hours in advance if you are unable to make your appointment, so that we can give that time to another patient in need. We care about you and the management of your healthcare and want to make sure that you follow up as recommended. To provide quality care and timely appointments to all our patients, you may be dismissed from the practice if you do not show for three (3) scheduled appointments within a 12-month period. We would like to continue treating your healthcare needs. Please call the office to reschedule your appointment, if needed.      Sincerely,        Ghanshyam Espino RN

## 2022-11-29 NOTE — Clinical Note
ASIYA [address]  [address] [facility] 93 [address]  Phone: [phone]  Fax: [phone]    Josie Espinoza RN        December 2, 2022     Patient: Erika Arevalo   YOB: 1965   Date of Visit: 11/29/2022       To Whom It May Concern: It is my medical opinion that Tonia Harry {Work release (duty restriction):07707}. If you have any questions or concerns, please don't hesitate to call.     Sincerely,        Josie Espinoza RN

## 2022-11-29 NOTE — Clinical Note
ASIYA [address]  [address] [facility] 93 [address]  Phone: [phone]  Fax: [phone]    Poppy Jimenez RN        December 2, 2022    [address] [place] [address]      Dear Estella Simon:    ***    If you have any questions or concerns, please don't hesitate to call.     Sincerely,        Poppy Jimenez RN

## 2022-11-29 NOTE — TELEPHONE ENCOUNTER
Received VM from pt requesting call back, no additional info left. PC back to pt, she is moving into senior housing at the beginning of the year and she would like to be able to move into a 2 bedroom apartment because she has a treadmill and exercise bike. Pt states that per the housing management, these items are considered medical equipment because they are helping the patient to manage her health. Pt has been making a concerted effort to lose weight between aquatic therapy and home exercise equipment. Pt states she is in need of a letter from PCP stating pt needs the treadmill and exercise bike to help her manage/improve her chronic health conditions. Dr. Jacquie Morse, letter is pended. Please review and make any changes needed. Please route back to me so that I can notify pt. Thank you.

## 2022-11-29 NOTE — Clinical Note
ASIYA [place] [address]  [address] [address]  Phone: [phone]  Fax: [phone]    Poppy Shen RN        December 2, 2022     [address] [place] [address]      Dear Marilu Oconnor: This letter is a reminder that your {:56480} {:43711} due. Please call our office to schedule an appointment. If you've already underwent or scheduled {:09460}, please disregard this notice.     Sincerely,        Poppy Shen RN

## 2022-12-30 NOTE — TELEPHONE ENCOUNTER
Received faxed paperwork from pt's housing [facility] re: request for accommodations. Writer and pt spoke, she has until 1/5/23 to be out of her current apt. Concern is that PCP will not be in office to sign paperwork - if needed - for another week as she is off next week. Pt is going to contact apts to make sure RN can sign off on the paperwork and call writer back. Blank forms scanned into chart. Pt called back and writer is able to fill out forms. Writer will complete and pt to pick packet up this afternoon so she can fill out her portion. Addendum 1319: Forms completed and scanned into media. PC to pt to let her know she can pick them up.

## 2023-01-10 DIAGNOSIS — Z79.4 TYPE 2 DIABETES MELLITUS WITHOUT COMPLICATION, WITH LONG-TERM CURRENT USE OF INSULIN (HCC): ICD-10-CM

## 2023-01-10 DIAGNOSIS — E66.01 MORBID OBESITY (HCC): ICD-10-CM

## 2023-01-10 DIAGNOSIS — E11.9 TYPE 2 DIABETES MELLITUS WITHOUT COMPLICATION, WITH LONG-TERM CURRENT USE OF INSULIN (HCC): ICD-10-CM

## 2023-01-10 RX ORDER — DULAGLUTIDE 0.75 MG/.5ML
0.75 INJECTION, SOLUTION SUBCUTANEOUS WEEKLY
Qty: 4 ADJUSTABLE DOSE PRE-FILLED PEN SYRINGE | Refills: 3 | Status: SHIPPED | OUTPATIENT
Start: 2023-01-10

## 2023-01-10 NOTE — TELEPHONE ENCOUNTER
PC to pt re: PAP for Trulicity, pt due to reapply for 2023. Pt states she has Medicare/Medicaid now, agreeable to sending script to pharmacy to see if it is covered/how much cost is before reapplying. Medication pended, please e-scribe to pharmacy.

## 2023-01-25 ENCOUNTER — TELEPHONE (OUTPATIENT)
Dept: INTERNAL MEDICINE | Age: 58
End: 2023-01-25

## 2023-01-25 DIAGNOSIS — E11.9 TYPE 2 DIABETES MELLITUS WITHOUT COMPLICATION, WITH LONG-TERM CURRENT USE OF INSULIN (HCC): Primary | ICD-10-CM

## 2023-01-25 DIAGNOSIS — Z79.4 TYPE 2 DIABETES MELLITUS WITHOUT COMPLICATION, WITH LONG-TERM CURRENT USE OF INSULIN (HCC): Primary | ICD-10-CM

## 2023-01-25 NOTE — TELEPHONE ENCOUNTER
Pt called in, states she has Medicaid insurance now. Requested diabetic testing supplies be sent to pharmacy. Orders pended.

## 2023-01-26 RX ORDER — GLUCOSAMINE HCL/CHONDROITIN SU 500-400 MG
CAPSULE ORAL
Qty: 100 STRIP | Refills: 11 | Status: SHIPPED | OUTPATIENT
Start: 2023-01-26

## 2023-01-26 RX ORDER — PEN NEEDLE, DIABETIC 31 GX5/16"
1 NEEDLE, DISPOSABLE MISCELLANEOUS 2 TIMES DAILY
Qty: 100 EACH | Refills: 3 | Status: SHIPPED | OUTPATIENT
Start: 2023-01-26

## 2023-01-26 RX ORDER — LANCETS 30 GAUGE
1 EACH MISCELLANEOUS DAILY
Qty: 100 EACH | Refills: 5 | Status: SHIPPED | OUTPATIENT
Start: 2023-01-26

## 2023-02-15 ENCOUNTER — TELEPHONE (OUTPATIENT)
Dept: INTERNAL MEDICINE | Age: 58
End: 2023-02-15

## 2023-02-15 DIAGNOSIS — G47.33 OSA ON CPAP: Primary | ICD-10-CM

## 2023-02-15 DIAGNOSIS — Z99.89 OSA ON CPAP: Primary | ICD-10-CM

## 2023-02-15 NOTE — TELEPHONE ENCOUNTER
Received PC from pt stating she had just received a notification that [employer] has sent a shipment of Trulicity. Pt's insurance recently kicked in and she has no copay for Trulicity at local pharmacy, pt no longer needs [employer] assistance in securing medication. Pt states she thought she called to tell them she no longer needs the medication through them. Writer advised pt to bring Trulicity to office once received and writer can arrange for the medication to be sent back.

## 2023-02-15 NOTE — TELEPHONE ENCOUNTER
Received VM from pt requesting PCP place order for sleep study. Pt states her CPAP machine was broken during her recent move and that it has been some years since her last study. Order pended, please route back to writer so I can notify pt she can call to schedule.

## 2023-02-20 ENCOUNTER — TELEPHONE (OUTPATIENT)
Dept: OBGYN | Age: 58
End: 2023-02-20

## 2023-02-28 NOTE — TELEPHONE ENCOUNTER
Pt brought Trulicity to office for writer to see about returning to [employer]. Phone number on packing slips is [phone] which is [employer] which [employer] uses to ship medications. Anayeli Paget states she will have a  come in 3-5 business days with a shipping label to  this medication in order to ship it back to the pharmacy. Margarette Paget states refrigeration of medication is not necessary as we are already unsure that it has continued to be refrigerated for the last couple weeks. Medication is packed up in a box and ready for , [facility] [address] aware  will supply return label.

## 2023-03-14 ENCOUNTER — HOSPITAL ENCOUNTER (OUTPATIENT)
Dept: MAMMOGRAPHY | Age: 58
Discharge: HOME | End: 2023-03-16
Payer: MEDICARE

## 2023-03-14 DIAGNOSIS — Z12.31 ENCOUNTER FOR SCREENING MAMMOGRAM FOR BREAST CANCER: ICD-10-CM

## 2023-03-14 PROCEDURE — 77063 BREAST TOMOSYNTHESIS BI: CPT

## 2023-03-20 ENCOUNTER — TELEPHONE (OUTPATIENT)
Dept: INTERNAL MEDICINE | Age: 58
End: 2023-03-20

## 2023-03-20 DIAGNOSIS — G47.33 OSA ON CPAP: Primary | ICD-10-CM

## 2023-03-20 DIAGNOSIS — Z99.89 OSA ON CPAP: Primary | ICD-10-CM

## 2023-03-20 NOTE — TELEPHONE ENCOUNTER
Writer received PC from pt stating she was contacted by [address] notifying her insurance is denying sleep study to get new CPAP machine. Pts machine broke during a recent move, last sleep study was done in 2017. PC to [facility] at [phone], spoke with Enid Olmedo and she believes insurance company just doesn't have enough information to approve request for sleep study. There is [employer] information in under referral.    PC to [employer] at [phone] to see if writer can provide additional information needed to approve sleep study. Spoke with Kathleen Berg, she states the case needs more information before true denial can be given. Kathleen Berg states, per insurance, need for titration must be r/t pt having significant weight loss/gain that would affect pressure settings, pt has failed to respond to treatment, or CPAP is no longer helping symptoms of MACHELLE. Writer then spoke with [facility] in the clinical review department, she did note that pt's machine is broken. Phone number for P2P is [phone], case number [id number]. Dr. Jen Jaffe, unsure if you want to pursue Peer to Peer for this or just try sending in a script for a new CPAP machine utilizing the previous settings. I have pended the script for the machine and supplies, but the P2P information is listed above. Please let me know which way you would like to proceed.

## 2023-03-27 NOTE — TELEPHONE ENCOUNTER
Order printed, faxed with last notes, sleep study, and insurance information to [address] [place] [address] at [phone] per pt request.

## 2023-04-08 DIAGNOSIS — I10 PRIMARY HYPERTENSION: ICD-10-CM

## 2023-04-11 RX ORDER — AMLODIPINE BESYLATE 10 MG/1
TABLET ORAL
Qty: 30 TABLET | Refills: 0 | Status: SHIPPED | OUTPATIENT
Start: 2023-04-11 | End: 2023-05-02 | Stop reason: SDUPTHER

## 2023-04-24 DIAGNOSIS — K21.9 GASTROESOPHAGEAL REFLUX DISEASE, UNSPECIFIED WHETHER ESOPHAGITIS PRESENT: ICD-10-CM

## 2023-04-24 NOTE — TELEPHONE ENCOUNTER
Request for omeprazole      Next Visit Date:5/2/23  Future Appointments   Date Time Provider Kiran Dewey   5/2/2023  8:40 AM Leigh Smith MD [facility]   5/11/2023  1:00 PM Kely Alex MD [facility]yn [address] Maintenance   Topic Date Due    Pneumococcal 0-64 years Vaccine (1 - PCV) Never done    Diabetic foot exam  Never done    Diabetic retinal exam  Never done    DTaP/Tdap/Td vaccine (1 - Tdap) Never done    Shingles vaccine (1 of 2) Never done    COVID-19 Vaccine (4 - Booster for Pfizer series) 03/24/2022    Diabetic Alb to Cr ratio (uACR) test  02/23/2023    Lipids  02/23/2023    GFR test (Diabetes, CKD 3-4, OR last GFR 15-59)  02/23/2023    Depression Screen  05/05/2023    Flu vaccine (Season Ended) 08/01/2023    A1C test (Diabetic or Prediabetic)  09/21/2023    Breast cancer screen  03/14/2024    Cervical cancer screen  08/12/2026    Colorectal Cancer Screen  10/27/2026    Hepatitis C screen  Completed    HIV screen  Completed    Hepatitis A vaccine  Aged Out    Hib vaccine  Aged Out    Meningococcal (ACWY) vaccine  Aged Out       Hemoglobin A1C (%)   Date Value   09/21/2022 5.9   02/23/2022 6.7 (H)   02/12/2021 6.4             ( goal A1C is < 7)   Microalb/Crt.  Ratio (mcg/mg creat)   Date Value   02/23/2022 Can not be calculated     LDL Cholesterol (mg/dL)   Date Value   02/23/2022 118       (goal LDL is <100)   AST (U/L)   Date Value   02/23/2022 20     ALT (U/L)   Date Value   02/23/2022 21     BUN (mg/dL)   Date Value   02/23/2022 14     BP Readings from Last 3 Encounters:   09/21/22 127/79   05/05/22 139/61   08/12/21 138/72          (goal 120/80)    All Future Testing planned in [facility]  Lab Frequency Next Occurrence   Sleep Study with PAP Titration Once 03/15/2023         Patient Active Problem List:     HTN (hypertension)     Anemia     Obesity     Gout     MACHELLE on CPAP     Cervical high risk HPV (human papillomavirus) test positive     Type 2 diabetes mellitus without

## 2023-04-26 RX ORDER — OMEPRAZOLE 20 MG/1
CAPSULE, DELAYED RELEASE ORAL
Qty: 60 CAPSULE | Refills: 3 | Status: SHIPPED | OUTPATIENT
Start: 2023-04-26

## 2023-04-29 SDOH — ECONOMIC STABILITY: HOUSING INSECURITY
IN THE LAST 12 MONTHS, WAS THERE A TIME WHEN YOU DID NOT HAVE A STEADY PLACE TO SLEEP OR SLEPT IN A SHELTER (INCLUDING NOW)?: NO

## 2023-04-29 SDOH — ECONOMIC STABILITY: FOOD INSECURITY: WITHIN THE PAST 12 MONTHS, YOU WORRIED THAT YOUR FOOD WOULD RUN OUT BEFORE YOU GOT MONEY TO BUY MORE.: NEVER TRUE

## 2023-04-29 SDOH — ECONOMIC STABILITY: FOOD INSECURITY: WITHIN THE PAST 12 MONTHS, THE FOOD YOU BOUGHT JUST DIDN'T LAST AND YOU DIDN'T HAVE MONEY TO GET MORE.: NEVER TRUE

## 2023-04-29 SDOH — ECONOMIC STABILITY: INCOME INSECURITY: HOW HARD IS IT FOR YOU TO PAY FOR THE VERY BASICS LIKE FOOD, HOUSING, MEDICAL CARE, AND HEATING?: NOT HARD AT ALL

## 2023-05-02 ENCOUNTER — OFFICE VISIT (OUTPATIENT)
Dept: INTERNAL MEDICINE | Age: 58
End: 2023-05-02
Payer: MEDICARE

## 2023-05-02 ENCOUNTER — HOSPITAL ENCOUNTER (OUTPATIENT)
Age: 58
Setting detail: SPECIMEN
Discharge: HOME | End: 2023-05-02

## 2023-05-02 VITALS
HEIGHT: 63 IN | DIASTOLIC BLOOD PRESSURE: 78 MMHG | OXYGEN SATURATION: 98 % | TEMPERATURE: 97.9 F | SYSTOLIC BLOOD PRESSURE: 138 MMHG | BODY MASS INDEX: 42.88 KG/M2 | WEIGHT: 242 LBS | HEART RATE: 79 BPM

## 2023-05-02 DIAGNOSIS — Z79.4 TYPE 2 DIABETES MELLITUS WITHOUT COMPLICATION, WITH LONG-TERM CURRENT USE OF INSULIN (HCC): ICD-10-CM

## 2023-05-02 DIAGNOSIS — K21.9 GASTROESOPHAGEAL REFLUX DISEASE, UNSPECIFIED WHETHER ESOPHAGITIS PRESENT: ICD-10-CM

## 2023-05-02 DIAGNOSIS — I10 PRIMARY HYPERTENSION: Primary | ICD-10-CM

## 2023-05-02 DIAGNOSIS — R60.0 BILATERAL EDEMA OF LOWER EXTREMITY: ICD-10-CM

## 2023-05-02 DIAGNOSIS — R01.1 SYSTOLIC MURMUR: ICD-10-CM

## 2023-05-02 DIAGNOSIS — E66.01 MORBID OBESITY (HCC): ICD-10-CM

## 2023-05-02 DIAGNOSIS — E11.9 TYPE 2 DIABETES MELLITUS WITHOUT COMPLICATION, WITH LONG-TERM CURRENT USE OF INSULIN (HCC): ICD-10-CM

## 2023-05-02 DIAGNOSIS — Z99.89 OSA ON CPAP: ICD-10-CM

## 2023-05-02 DIAGNOSIS — Z79.899 CURRENT USE OF PROTON PUMP INHIBITOR: ICD-10-CM

## 2023-05-02 DIAGNOSIS — G47.33 OSA ON CPAP: ICD-10-CM

## 2023-05-02 LAB
CHOLEST SERPL-MCNC: 174 MG/DL
CHOLESTEROL/HDL RATIO: 4.7
CREATININE URINE: 207.1 MG/DL (ref 28–217)
EST. AVERAGE GLUCOSE BLD GHB EST-MCNC: 117 MG/DL
HBA1C MFR BLD: 5.7 % (ref 4–6)
HCT VFR BLD AUTO: 37.3 % (ref 36.3–47.1)
HDLC SERPL-MCNC: 37 MG/DL
HGB BLD-MCNC: 11.4 G/DL (ref 11.9–15.1)
LDLC SERPL CALC-MCNC: 114 MG/DL (ref 0–130)
MCH RBC QN AUTO: 26.9 PG (ref 25.2–33.5)
MCHC RBC AUTO-ENTMCNC: 30.6 G/DL (ref 28.4–34.8)
MCV RBC AUTO: 88 FL (ref 82.6–102.9)
MICROALBUMIN/CREAT 24H UR: <12 MG/L
MICROALBUMIN/CREAT UR-RTO: NORMAL MCG/MG CREAT
NRBC AUTOMATED: 0 PER 100 WBC
PDW BLD-RTO: 13.8 % (ref 11.8–14.4)
PLATELET # BLD AUTO: 317 K/UL (ref 138–453)
PMV BLD AUTO: 12.1 FL (ref 8.1–13.5)
RBC # BLD: 4.24 M/UL (ref 3.95–5.11)
TRIGL SERPL-MCNC: 115 MG/DL
WBC # BLD AUTO: 6.9 K/UL (ref 3.5–11.3)

## 2023-05-02 PROCEDURE — 99214 OFFICE O/P EST MOD 30 MIN: CPT | Performed by: INTERNAL MEDICINE

## 2023-05-02 PROCEDURE — 3075F SYST BP GE 130 - 139MM HG: CPT | Performed by: INTERNAL MEDICINE

## 2023-05-02 PROCEDURE — 3078F DIAST BP <80 MM HG: CPT | Performed by: INTERNAL MEDICINE

## 2023-05-02 RX ORDER — LOSARTAN POTASSIUM AND HYDROCHLOROTHIAZIDE 25; 100 MG/1; MG/1
1 TABLET ORAL DAILY
Qty: 90 TABLET | Refills: 1 | Status: SHIPPED | OUTPATIENT
Start: 2023-05-02

## 2023-05-02 RX ORDER — DULAGLUTIDE 0.75 MG/.5ML
0.75 INJECTION, SOLUTION SUBCUTANEOUS WEEKLY
Qty: 4 ADJUSTABLE DOSE PRE-FILLED PEN SYRINGE | Refills: 5 | Status: CANCELLED | OUTPATIENT
Start: 2023-05-02

## 2023-05-02 RX ORDER — DULAGLUTIDE 1.5 MG/.5ML
1.5 INJECTION, SOLUTION SUBCUTANEOUS WEEKLY
Qty: 4 ADJUSTABLE DOSE PRE-FILLED PEN SYRINGE | Refills: 5 | Status: SHIPPED | OUTPATIENT
Start: 2023-05-02

## 2023-05-02 RX ORDER — FUROSEMIDE 20 MG/1
20 TABLET ORAL DAILY PRN
Qty: 30 TABLET | Refills: 5 | Status: SHIPPED | OUTPATIENT
Start: 2023-05-02

## 2023-05-02 RX ORDER — NYSTATIN 100000 U/G
CREAM TOPICAL DAILY PRN
Qty: 1 EACH | Refills: 5 | Status: SHIPPED | OUTPATIENT
Start: 2023-05-02

## 2023-05-02 RX ORDER — AMLODIPINE BESYLATE 10 MG/1
10 TABLET ORAL DAILY
Qty: 90 TABLET | Refills: 1 | Status: SHIPPED | OUTPATIENT
Start: 2023-05-02

## 2023-05-02 RX ORDER — OMEPRAZOLE 20 MG/1
20 CAPSULE, DELAYED RELEASE ORAL DAILY PRN
Qty: 30 CAPSULE | Refills: 3 | Status: SHIPPED | OUTPATIENT
Start: 2023-05-02

## 2023-05-02 SDOH — ECONOMIC STABILITY: INCOME INSECURITY: HOW HARD IS IT FOR YOU TO PAY FOR THE VERY BASICS LIKE FOOD, HOUSING, MEDICAL CARE, AND HEATING?: NOT VERY HARD

## 2023-05-02 SDOH — ECONOMIC STABILITY: HOUSING INSECURITY
IN THE LAST 12 MONTHS, WAS THERE A TIME WHEN YOU DID NOT HAVE A STEADY PLACE TO SLEEP OR SLEPT IN A SHELTER (INCLUDING NOW)?: YES

## 2023-05-02 SDOH — ECONOMIC STABILITY: FOOD INSECURITY: WITHIN THE PAST 12 MONTHS, THE FOOD YOU BOUGHT JUST DIDN'T LAST AND YOU DIDN'T HAVE MONEY TO GET MORE.: NEVER TRUE

## 2023-05-02 SDOH — ECONOMIC STABILITY: FOOD INSECURITY: WITHIN THE PAST 12 MONTHS, YOU WORRIED THAT YOUR FOOD WOULD RUN OUT BEFORE YOU GOT MONEY TO BUY MORE.: NEVER TRUE

## 2023-05-02 ASSESSMENT — PATIENT HEALTH QUESTIONNAIRE - PHQ9
1. LITTLE INTEREST OR PLEASURE IN DOING THINGS: 0
2. FEELING DOWN, DEPRESSED OR HOPELESS: 0
SUM OF ALL RESPONSES TO PHQ9 QUESTIONS 1 & 2: 0
SUM OF ALL RESPONSES TO PHQ QUESTIONS 1-9: 0

## 2023-05-02 ASSESSMENT — ENCOUNTER SYMPTOMS
BLOOD IN STOOL: 0
COUGH: 0
ABDOMINAL PAIN: 0
SHORTNESS OF BREATH: 0
CONSTIPATION: 0
WHEEZING: 0

## 2023-05-02 NOTE — PROGRESS NOTES
healthy behaviors: nutrition, exercise, and medication adherence    Reviewed prior labs and health maintenance. Discussed use, benefit, and side effects of prescribed medications. Barriers to medication compliance addressed. All patient questions answered. Pt voiced understanding.      Patient given educational materials - see patient instructions    Melanie Crabtree  Attending Physician, [address] [place] [address], Internal Medicine Residency Program  [address] [place] [address]  5/2/2023, 8:58 AM

## 2023-05-12 ENCOUNTER — TELEPHONE (OUTPATIENT)
Dept: INTERNAL MEDICINE | Age: 58
End: 2023-05-12

## 2023-05-12 DIAGNOSIS — E55.9 VITAMIN D DEFICIENCY: Primary | ICD-10-CM

## 2023-05-12 DIAGNOSIS — E58 CALCIUM DEFICIENCY: ICD-10-CM

## 2023-05-12 DIAGNOSIS — K21.9 GASTROESOPHAGEAL REFLUX DISEASE, UNSPECIFIED WHETHER ESOPHAGITIS PRESENT: ICD-10-CM

## 2023-05-12 DIAGNOSIS — R60.0 BILATERAL EDEMA OF LOWER EXTREMITY: ICD-10-CM

## 2023-05-12 DIAGNOSIS — Z79.4 TYPE 2 DIABETES MELLITUS WITHOUT COMPLICATION, WITH LONG-TERM CURRENT USE OF INSULIN (HCC): ICD-10-CM

## 2023-05-12 DIAGNOSIS — E11.9 TYPE 2 DIABETES MELLITUS WITHOUT COMPLICATION, WITH LONG-TERM CURRENT USE OF INSULIN (HCC): ICD-10-CM

## 2023-05-12 DIAGNOSIS — E66.01 MORBID OBESITY (HCC): ICD-10-CM

## 2023-05-12 RX ORDER — OMEPRAZOLE 20 MG/1
20 CAPSULE, DELAYED RELEASE ORAL DAILY PRN
Qty: 90 CAPSULE | Refills: 0 | Status: SHIPPED | OUTPATIENT
Start: 2023-05-12

## 2023-05-12 RX ORDER — DULAGLUTIDE 1.5 MG/.5ML
1.5 INJECTION, SOLUTION SUBCUTANEOUS WEEKLY
Qty: 12 ADJUSTABLE DOSE PRE-FILLED PEN SYRINGE | Refills: 5 | Status: SHIPPED | OUTPATIENT
Start: 2023-05-12

## 2023-05-12 RX ORDER — FUROSEMIDE 20 MG/1
20 TABLET ORAL DAILY PRN
Qty: 90 TABLET | Refills: 1 | Status: CANCELLED | OUTPATIENT
Start: 2023-05-12

## 2023-05-19 ENCOUNTER — HOSPITAL ENCOUNTER (OUTPATIENT)
Dept: NON INVASIVE DIAGNOSTICS | Age: 58
Discharge: HOME | End: 2023-05-19
Payer: MEDICARE

## 2023-05-19 DIAGNOSIS — R01.1 SYSTOLIC MURMUR: ICD-10-CM

## 2023-05-19 LAB
LV EF: 55 %
LVEF MODALITY: NORMAL

## 2023-05-19 PROCEDURE — 93306 TTE W/DOPPLER COMPLETE: CPT

## 2023-05-22 ENCOUNTER — TELEPHONE (OUTPATIENT)
Dept: INTERNAL MEDICINE | Age: 58
End: 2023-05-22

## 2023-05-22 DIAGNOSIS — G47.33 OSA ON CPAP: Primary | ICD-10-CM

## 2023-05-22 DIAGNOSIS — Z99.89 OSA ON CPAP: Primary | ICD-10-CM

## 2023-05-22 NOTE — TELEPHONE ENCOUNTER
Received VM from pt re: medication issue at pharmacy. Writer fairly sure the problem is r/t calcium-vit D dosage clarification. New script was pended in encounter dated 5/12, waiting for PCP to address clarification issue. PC to pt to discuss, no answer. Left message with above information as pt identifies herself on VM.

## 2023-05-22 NOTE — TELEPHONE ENCOUNTER
Pt called back, confirmed medication problem is with calcium-vit D. Pt then stated she is waking up with a dry mouth using her CPAP. Pt was advised by [address] [place] [address] to contact [facility] and doctor's office. Unsure what we are supposed to do. Pt states she uses a full face mask, sounds like she is breathing through her mouth when using CPAP. Order pended for chin strap.

## 2023-06-01 ENCOUNTER — HOSPITAL ENCOUNTER (OUTPATIENT)
Age: 58
Setting detail: SPECIMEN
Discharge: HOME | End: 2023-06-01

## 2023-06-01 ENCOUNTER — OFFICE VISIT (OUTPATIENT)
Dept: OBGYN | Age: 58
End: 2023-06-01
Payer: MEDICARE

## 2023-06-01 VITALS
HEART RATE: 70 BPM | BODY MASS INDEX: 42.69 KG/M2 | SYSTOLIC BLOOD PRESSURE: 153 MMHG | DIASTOLIC BLOOD PRESSURE: 79 MMHG | WEIGHT: 241 LBS

## 2023-06-01 DIAGNOSIS — Z01.419 WELL WOMAN EXAM WITH ROUTINE GYNECOLOGICAL EXAM: Primary | ICD-10-CM

## 2023-06-01 DIAGNOSIS — N89.8 VAGINAL DISCHARGE: ICD-10-CM

## 2023-06-01 DIAGNOSIS — Z12.4 PAP SMEAR FOR CERVICAL CANCER SCREENING: ICD-10-CM

## 2023-06-01 DIAGNOSIS — Z80.3 FAMILY HISTORY OF BREAST CANCER IN FIRST DEGREE RELATIVE: ICD-10-CM

## 2023-06-01 DIAGNOSIS — R87.618 PAP SMEAR ABNORMALITY OF CERVIX/HUMAN PAPILLOMAVIRUS (HPV) POSITIVE: ICD-10-CM

## 2023-06-01 DIAGNOSIS — Z01.419 WELL WOMAN EXAM WITH ROUTINE GYNECOLOGICAL EXAM: ICD-10-CM

## 2023-06-01 LAB
CANDIDA SPECIES, DNA PROBE: NEGATIVE
GARDNERELLA VAGINALIS, DNA PROBE: NEGATIVE
SOURCE: NORMAL
TRICHOMONAS VAGINALIS DNA: NEGATIVE

## 2023-06-01 PROCEDURE — 99396 PREV VISIT EST AGE 40-64: CPT | Performed by: OBSTETRICS & GYNECOLOGY

## 2023-06-01 PROCEDURE — 3074F SYST BP LT 130 MM HG: CPT | Performed by: OBSTETRICS & GYNECOLOGY

## 2023-06-01 PROCEDURE — 3078F DIAST BP <80 MM HG: CPT | Performed by: OBSTETRICS & GYNECOLOGY

## 2023-06-02 LAB
C TRACH DNA SPEC QL PROBE+SIG AMP: NEGATIVE
N GONORRHOEA DNA SPEC QL PROBE+SIG AMP: NEGATIVE
SPECIMEN DESCRIPTION: NORMAL

## 2023-06-06 LAB
HPV SAMPLE: ABNORMAL
HPV, GENOTYPE 16: NOT DETECTED
HPV, GENOTYPE 18: DETECTED
HPV, HIGH RISK OTHER: NOT DETECTED
HPV, INTERPRETATION: ABNORMAL
SPECIMEN DESCRIPTION: ABNORMAL

## 2023-06-06 NOTE — PROGRESS NOTES
Attending Physician Statement  I have discussed the care of Kenny Petit, including pertinent history and exam findings,  with the resident. I have seen and examined the patient and the key elements of all parts of the encounter have been performed by me. I agree with the assessment, plan and orders as documented by the resident.   ([address])    Jeramie Christina DO

## 2023-06-07 LAB — CYTOLOGY REPORT: NORMAL

## 2023-06-15 ENCOUNTER — TELEPHONE (OUTPATIENT)
Dept: PHARMACY | Facility: CLINIC | Age: 58
End: 2023-06-15

## 2023-06-15 DIAGNOSIS — E11.9 TYPE 2 DIABETES MELLITUS WITHOUT COMPLICATION, WITH LONG-TERM CURRENT USE OF INSULIN (HCC): Primary | ICD-10-CM

## 2023-06-15 DIAGNOSIS — Z79.4 TYPE 2 DIABETES MELLITUS WITHOUT COMPLICATION, WITH LONG-TERM CURRENT USE OF INSULIN (HCC): Primary | ICD-10-CM

## 2023-06-15 DIAGNOSIS — E78.00 PURE HYPERCHOLESTEROLEMIA: ICD-10-CM

## 2023-06-15 RX ORDER — ATORVASTATIN CALCIUM 20 MG/1
20 TABLET, FILM COATED ORAL DAILY
Qty: 30 TABLET | Refills: 3 | Status: SHIPPED | OUTPATIENT
Start: 2023-06-15

## 2023-06-20 NOTE — TELEPHONE ENCOUNTER
Noted Mychart message has been read.  Will close encounter.    =======================================================   For Pharmacy Admin Tracking Only    Program: [address] in place:  No  Recommendation Provided To: Provider: 1 via Note to Provider and Patient/Caregiver: 1 via iRewind  Intervention Detail: New Rx: 1, reason: Needs Additional Therapy  Intervention Accepted By: Provider: 1 and Patient/Caregiver: 1  Gap Closed?: Yes   Time Spent (min): 20

## 2023-06-21 ENCOUNTER — PROCEDURE VISIT (OUTPATIENT)
Dept: OBGYN | Age: 58
End: 2023-06-21

## 2023-06-21 VITALS
DIASTOLIC BLOOD PRESSURE: 87 MMHG | WEIGHT: 240 LBS | HEART RATE: 69 BPM | SYSTOLIC BLOOD PRESSURE: 177 MMHG | BODY MASS INDEX: 42.51 KG/M2

## 2023-06-21 DIAGNOSIS — R87.619 ABNORMAL CERVICAL PAPANICOLAOU SMEAR, UNSPECIFIED ABNORMAL PAP FINDING: Primary | ICD-10-CM

## 2023-06-22 NOTE — PROGRESS NOTES
Attending Physician Statement  I have discussed the care of Lawrence De Dios, including pertinent history and exam findings,  with the resident. I have seen and examined the patient and the key elements of all parts of the encounter have been performed by me. I agree with the assessment, plan and orders as documented by the resident.   (Ryanne Alejandra)    Hazel Clay, DO
All counts and instruments were correct at the end of the procedure. Discussed with patient that given inability to sample endocervical lining and HPV 18 on PAP in , recommended excisional procedure in the form of cold knife conization of the cervix. Alternatively, patient also offered short interval repeat PAP smear in 3 months. She would like to proceed with surgery at this time. Lab:  Pregnancy Test:  No results found for: PREGTESTUR, PREGSERUM, HCG, HCGQUANT    Assessment & Plan:  Lindsey Robin 59 y.o. female    - Slightly hypertensive today, otherwise VSS   - No complaints today   - Colpo performed for HPV 18 on Pap. Cytology was negative for abnormal cells. ECC not able to be performed because of cervical stenosis   - Will schedule patient for CKC. Message sent to surgery scheduler. Patient to be called with date and time for procedure. She will need to return in 2 weeks after surgery for post op appointment. - Return precautions reviewed in detail    HTN   - Pt BP elevated today   - She states that she took her meds   - Advised close outpatient f/u with PCP    Patient Active Problem List    Diagnosis Date Noted    Type 2 diabetes mellitus without complication, with long-term current use of insulin ([facility] [address] 2022     Priority: Medium    Cervical high risk HPV (human papillomavirus) test positive 2021     PATIENT WILL NEED REPEAT PAP 2022        MACHELLE on CPAP 10/27/2017    Gout 2014    Anemia 2013    Obesity 2013    HTN (hypertension) 2012     The patient was counseled on follow up and home care with restrictions noted. Return for 2 weeks after surgery.     Charity Frank MD  Ob/Gyn Resident  [facility], [facility]  2023, 4:55 PM

## 2023-06-28 ENCOUNTER — TELEPHONE (OUTPATIENT)
Dept: OBGYN | Age: 58
End: 2023-06-28

## 2023-07-17 DIAGNOSIS — E11.9 TYPE 2 DIABETES MELLITUS WITHOUT COMPLICATION, WITH LONG-TERM CURRENT USE OF INSULIN (HCC): ICD-10-CM

## 2023-07-17 DIAGNOSIS — E78.00 PURE HYPERCHOLESTEROLEMIA: ICD-10-CM

## 2023-07-17 DIAGNOSIS — Z79.4 TYPE 2 DIABETES MELLITUS WITHOUT COMPLICATION, WITH LONG-TERM CURRENT USE OF INSULIN (HCC): ICD-10-CM

## 2023-07-17 NOTE — TELEPHONE ENCOUNTER
Received fax communication from pharmacy requesting 100 day supply of Atorvastatin. Last visit: 5/2/23  Last Med refill: 6/15/23  Does patient have enough medication for 72 hours: Yes    Next Visit Date:  Future Appointments   Date Time Provider [address]   9/11/2023  3:30 PM Rosmery Abdullahi DO [facility] [address] Maintenance   Topic Date Due    Pneumococcal 0-64 years Vaccine (1 - PCV) Never done    Diabetic foot exam  Never done    Diabetic retinal exam  Never done    DTaP/Tdap/Td vaccine (1 - Tdap) Never done    Shingles vaccine (1 of 2) Never done    COVID-19 Vaccine (4 - Booster for Pfizer series) 03/24/2022    GFR test (Diabetes, CKD 3-4, OR last GFR 15-59)  02/23/2023    Annual Wellness Visit (AWV)  Never done    Flu vaccine (1) 08/01/2023    Breast cancer screen  03/14/2024    A1C test (Diabetic or Prediabetic)  05/02/2024    Diabetic Alb to Cr ratio (uACR) test  05/02/2024    Lipids  05/02/2024    Depression Screen  05/02/2024    Colorectal Cancer Screen  10/27/2026    Cervical cancer screen  06/01/2028    Hepatitis C screen  Completed    HIV screen  Completed    Hepatitis A vaccine  Aged Out    Hib vaccine  Aged Out    Meningococcal (ACWY) vaccine  Aged Out       Hemoglobin A1C (%)   Date Value   05/02/2023 5.7   09/21/2022 5.9   02/23/2022 6.7 (H)             ( goal A1C is < 7)   Microalb/Crt.  Ratio (mcg/mg creat)   Date Value   05/02/2023 Can not be calculated     LDL Cholesterol (mg/dL)   Date Value   05/02/2023 114   02/23/2022 118       (goal LDL is <100)   AST (U/L)   Date Value   02/23/2022 20     ALT (U/L)   Date Value   02/23/2022 21     BUN (mg/dL)   Date Value   02/23/2022 14     BP Readings from Last 3 Encounters:   06/21/23 (!) 177/87   06/01/23 (!) 153/79   05/02/23 138/78          (goal 120/80)    All Future Testing planned in [facility]  Lab Frequency Next Occurrence   Sleep Study with PAP Titration Once 03/15/2023   Comprehensive Metabolic Panel Once 54/23/5552   PAP

## 2023-07-18 RX ORDER — ATORVASTATIN CALCIUM 20 MG/1
20 TABLET, FILM COATED ORAL DAILY
Qty: 100 TABLET | Refills: 0 | Status: SHIPPED | OUTPATIENT
Start: 2023-07-18

## 2023-08-27 ENCOUNTER — ANESTHESIA EVENT (OUTPATIENT)
Dept: OPERATING ROOM | Age: 58
End: 2023-08-27
Payer: MEDICARE

## 2023-08-28 ENCOUNTER — ANESTHESIA (OUTPATIENT)
Dept: OPERATING ROOM | Age: 58
End: 2023-08-28
Payer: MEDICARE

## 2023-08-28 ENCOUNTER — HOSPITAL ENCOUNTER (OUTPATIENT)
Age: 58
Setting detail: OUTPATIENT SURGERY
Discharge: HOME | End: 2023-08-28
Attending: OBSTETRICS & GYNECOLOGY | Admitting: OBSTETRICS & GYNECOLOGY
Payer: MEDICARE

## 2023-08-28 VITALS
WEIGHT: 235 LBS | SYSTOLIC BLOOD PRESSURE: 157 MMHG | BODY MASS INDEX: 41.64 KG/M2 | HEART RATE: 76 BPM | HEIGHT: 63 IN | TEMPERATURE: 97.2 F | DIASTOLIC BLOOD PRESSURE: 71 MMHG | OXYGEN SATURATION: 95 % | RESPIRATION RATE: 20 BRPM

## 2023-08-28 DIAGNOSIS — R87.629 ABNORMAL VAGINAL PAP SMEAR: ICD-10-CM

## 2023-08-28 DIAGNOSIS — B97.7 HPV IN FEMALE: ICD-10-CM

## 2023-08-28 PROBLEM — Z98.890 POSTOPERATIVE STATE: Status: ACTIVE | Noted: 2023-08-28

## 2023-08-28 LAB
BUN BLD-MCNC: 12 MG/DL (ref 8–26)
CHLORIDE BLD-SCNC: 105 MMOL/L (ref 98–107)
CO2 BLD CALC-SCNC: 27 MMOL/L (ref 22–30)
EGFR, POC: >60 ML/MIN/1.73M2
GLUCOSE BLD-MCNC: 100 MG/DL (ref 74–100)
GLUCOSE BLD-MCNC: 93 MG/DL (ref 65–105)
POC ANION GAP: 10 MMOL/L (ref 7–16)
POC CREATININE: 0.6 MG/DL (ref 0.51–1.19)
POTASSIUM BLD-SCNC: 4.3 MMOL/L (ref 3.5–4.5)
SODIUM BLD-SCNC: 141 MMOL/L (ref 138–146)

## 2023-08-28 PROCEDURE — 82947 ASSAY GLUCOSE BLOOD QUANT: CPT

## 2023-08-28 PROCEDURE — 7100000010 HC PHASE II RECOVERY - FIRST 15 MIN: Performed by: OBSTETRICS & GYNECOLOGY

## 2023-08-28 PROCEDURE — 2580000003 HC RX 258: Performed by: OBSTETRICS & GYNECOLOGY

## 2023-08-28 PROCEDURE — 3600000013 HC SURGERY LEVEL 3 ADDTL 15MIN: Performed by: OBSTETRICS & GYNECOLOGY

## 2023-08-28 PROCEDURE — 80051 ELECTROLYTE PANEL: CPT

## 2023-08-28 PROCEDURE — 2709999900 HC NON-CHARGEABLE SUPPLY: Performed by: OBSTETRICS & GYNECOLOGY

## 2023-08-28 PROCEDURE — 93005 ELECTROCARDIOGRAM TRACING: CPT | Performed by: ANESTHESIOLOGY

## 2023-08-28 PROCEDURE — 2500000003 HC RX 250 WO HCPCS: Performed by: OBSTETRICS & GYNECOLOGY

## 2023-08-28 PROCEDURE — 3600000003 HC SURGERY LEVEL 3 BASE: Performed by: OBSTETRICS & GYNECOLOGY

## 2023-08-28 PROCEDURE — 3700000000 HC ANESTHESIA ATTENDED CARE: Performed by: OBSTETRICS & GYNECOLOGY

## 2023-08-28 PROCEDURE — 82565 ASSAY OF CREATININE: CPT

## 2023-08-28 PROCEDURE — 6370000000 HC RX 637 (ALT 250 FOR IP): Performed by: OBSTETRICS & GYNECOLOGY

## 2023-08-28 PROCEDURE — 3700000001 HC ADD 15 MINUTES (ANESTHESIA): Performed by: OBSTETRICS & GYNECOLOGY

## 2023-08-28 PROCEDURE — 2500000003 HC RX 250 WO HCPCS

## 2023-08-28 PROCEDURE — 2580000003 HC RX 258

## 2023-08-28 PROCEDURE — 57520 CONIZATION OF CERVIX: CPT | Performed by: OBSTETRICS & GYNECOLOGY

## 2023-08-28 PROCEDURE — 7100000001 HC PACU RECOVERY - ADDTL 15 MIN: Performed by: OBSTETRICS & GYNECOLOGY

## 2023-08-28 PROCEDURE — 88305 TISSUE EXAM BY PATHOLOGIST: CPT

## 2023-08-28 PROCEDURE — 84520 ASSAY OF UREA NITROGEN: CPT

## 2023-08-28 PROCEDURE — 6360000002 HC RX W HCPCS

## 2023-08-28 PROCEDURE — 7100000000 HC PACU RECOVERY - FIRST 15 MIN: Performed by: OBSTETRICS & GYNECOLOGY

## 2023-08-28 PROCEDURE — 88307 TISSUE EXAM BY PATHOLOGIST: CPT

## 2023-08-28 PROCEDURE — 7100000011 HC PHASE II RECOVERY - ADDTL 15 MIN: Performed by: OBSTETRICS & GYNECOLOGY

## 2023-08-28 RX ORDER — ONDANSETRON 2 MG/ML
INJECTION INTRAMUSCULAR; INTRAVENOUS PRN
Status: DISCONTINUED | OUTPATIENT
Start: 2023-08-28 | End: 2023-08-28 | Stop reason: SDUPTHER

## 2023-08-28 RX ORDER — BUPIVACAINE HYDROCHLORIDE AND EPINEPHRINE 5; 5 MG/ML; UG/ML
INJECTION, SOLUTION EPIDURAL; INTRACAUDAL; PERINEURAL PRN
Status: DISCONTINUED | OUTPATIENT
Start: 2023-08-28 | End: 2023-08-28 | Stop reason: HOSPADM

## 2023-08-28 RX ORDER — PROPOFOL 10 MG/ML
INJECTION, EMULSION INTRAVENOUS PRN
Status: DISCONTINUED | OUTPATIENT
Start: 2023-08-28 | End: 2023-08-28 | Stop reason: SDUPTHER

## 2023-08-28 RX ORDER — FENTANYL CITRATE 50 UG/ML
INJECTION, SOLUTION INTRAMUSCULAR; INTRAVENOUS PRN
Status: DISCONTINUED | OUTPATIENT
Start: 2023-08-28 | End: 2023-08-28 | Stop reason: SDUPTHER

## 2023-08-28 RX ORDER — KETOROLAC TROMETHAMINE 30 MG/ML
INJECTION, SOLUTION INTRAMUSCULAR; INTRAVENOUS PRN
Status: DISCONTINUED | OUTPATIENT
Start: 2023-08-28 | End: 2023-08-28 | Stop reason: SDUPTHER

## 2023-08-28 RX ORDER — DEXAMETHASONE SODIUM PHOSPHATE 10 MG/ML
INJECTION INTRAMUSCULAR; INTRAVENOUS PRN
Status: DISCONTINUED | OUTPATIENT
Start: 2023-08-28 | End: 2023-08-28 | Stop reason: SDUPTHER

## 2023-08-28 RX ORDER — SODIUM CHLORIDE, SODIUM LACTATE, POTASSIUM CHLORIDE, CALCIUM CHLORIDE 600; 310; 30; 20 MG/100ML; MG/100ML; MG/100ML; MG/100ML
INJECTION, SOLUTION INTRAVENOUS CONTINUOUS PRN
Status: DISCONTINUED | OUTPATIENT
Start: 2023-08-28 | End: 2023-08-28 | Stop reason: SDUPTHER

## 2023-08-28 RX ORDER — PHENYLEPHRINE HCL IN 0.9% NACL 1 MG/10 ML
SYRINGE (ML) INTRAVENOUS PRN
Status: DISCONTINUED | OUTPATIENT
Start: 2023-08-28 | End: 2023-08-28 | Stop reason: SDUPTHER

## 2023-08-28 RX ORDER — IBUPROFEN 600 MG/1
600 TABLET ORAL EVERY 6 HOURS PRN
Qty: 90 TABLET | Refills: 0 | Status: SHIPPED | OUTPATIENT
Start: 2023-08-28 | End: 2023-09-27

## 2023-08-28 RX ORDER — MIDAZOLAM HYDROCHLORIDE 1 MG/ML
INJECTION INTRAMUSCULAR; INTRAVENOUS PRN
Status: DISCONTINUED | OUTPATIENT
Start: 2023-08-28 | End: 2023-08-28 | Stop reason: SDUPTHER

## 2023-08-28 RX ORDER — MAGNESIUM HYDROXIDE 1200 MG/15ML
LIQUID ORAL CONTINUOUS PRN
Status: COMPLETED | OUTPATIENT
Start: 2023-08-28 | End: 2023-08-28

## 2023-08-28 RX ORDER — SENNOSIDES A AND B 8.6 MG/1
1 TABLET, FILM COATED ORAL 2 TIMES DAILY
Qty: 14 TABLET | Refills: 0 | Status: SHIPPED | OUTPATIENT
Start: 2023-08-28

## 2023-08-28 RX ORDER — LIDOCAINE HYDROCHLORIDE 10 MG/ML
INJECTION, SOLUTION EPIDURAL; INFILTRATION; INTRACAUDAL; PERINEURAL PRN
Status: DISCONTINUED | OUTPATIENT
Start: 2023-08-28 | End: 2023-08-28 | Stop reason: SDUPTHER

## 2023-08-28 RX ORDER — ACETAMINOPHEN 500 MG
1000 TABLET ORAL EVERY 6 HOURS PRN
Qty: 60 TABLET | Refills: 0 | Status: SHIPPED | OUTPATIENT
Start: 2023-08-28 | End: 2023-09-27

## 2023-08-28 RX ADMIN — DEXAMETHASONE SODIUM PHOSPHATE 10 MG: 10 INJECTION INTRAMUSCULAR; INTRAVENOUS at 08:50

## 2023-08-28 RX ADMIN — Medication 100 MCG: at 09:11

## 2023-08-28 RX ADMIN — Medication 100 MCG: at 08:59

## 2023-08-28 RX ADMIN — Medication 100 MCG: at 08:52

## 2023-08-28 RX ADMIN — LIDOCAINE HYDROCHLORIDE 50 MG: 10 INJECTION, SOLUTION EPIDURAL; INFILTRATION; INTRACAUDAL; PERINEURAL at 08:45

## 2023-08-28 RX ADMIN — FENTANYL CITRATE 25 MCG: 50 INJECTION, SOLUTION INTRAMUSCULAR; INTRAVENOUS at 08:45

## 2023-08-28 RX ADMIN — MIDAZOLAM 2 MG: 1 INJECTION INTRAMUSCULAR; INTRAVENOUS at 08:39

## 2023-08-28 RX ADMIN — SODIUM CHLORIDE, POTASSIUM CHLORIDE, SODIUM LACTATE AND CALCIUM CHLORIDE: 600; 310; 30; 20 INJECTION, SOLUTION INTRAVENOUS at 08:35

## 2023-08-28 RX ADMIN — Medication 100 MCG: at 09:05

## 2023-08-28 RX ADMIN — KETOROLAC TROMETHAMINE 30 MG: 30 INJECTION, SOLUTION INTRAMUSCULAR; INTRAVENOUS at 09:12

## 2023-08-28 RX ADMIN — PROPOFOL 200 MG: 10 INJECTION, EMULSION INTRAVENOUS at 08:45

## 2023-08-28 RX ADMIN — ONDANSETRON 4 MG: 2 INJECTION INTRAMUSCULAR; INTRAVENOUS at 08:50

## 2023-08-28 ASSESSMENT — PAIN SCALES - GENERAL
PAINLEVEL_OUTOF10: 0

## 2023-08-28 ASSESSMENT — PAIN - FUNCTIONAL ASSESSMENT: PAIN_FUNCTIONAL_ASSESSMENT: 0-10

## 2023-08-28 ASSESSMENT — LIFESTYLE VARIABLES: SMOKING_STATUS: 0

## 2023-08-28 NOTE — ANESTHESIA POSTPROCEDURE EVALUATION
Department of Anesthesiology  Postprocedure Note    Patient: Azar Edwards  MRN: [id number]  [address]: 1965  Date of evaluation: 8/28/2023      Procedure Summary     Date: 08/28/23 Room / Location: [address]    Anesthesia Start: 5155 Anesthesia Stop: 0747    Procedure: COLD KNIFE CONE Diagnosis:       Abnormal vaginal Pap smear      HPV in female      (Abnormal vaginal Pap smear [R87.629])      (HPV in female [B60.9])    Surgeons: Georgina Wan DO Responsible Provider: Deandra Palomo MD    Anesthesia Type: general ASA Status: 3          Anesthesia Type: No value filed.     Omayra Phase I: Omayra Score: 8    Omayra Phase II: Omayra Score: 10      Anesthesia Post Evaluation    Patient location during evaluation: PACU  Patient participation: complete - patient participated  Level of consciousness: awake  Pain score: 0  Cardiovascular status: hemodynamically stable  Respiratory status: room air

## 2023-08-28 NOTE — BRIEF OP NOTE
Brief Operative Note  Department of Obstetrics and Gynecology  [address]     Patient: Azar Edwards   : 1965  MRN: [id number]       Acct: [de-identified]   Date of Procedure: 23     Pre-operative Diagnosis: 59 y.o. female     High Risk Cervical HPV 18+  Cervical Stenosis  Type 2 Diabetes Melllitus  Hypertension  Anemia  Obstructive Sleep Apnea  History of Cholcystectomy  History of Tubal Ligation  BMI 41    Post-operative Diagnosis:   High Risk Cervical HPV 18+  Cervical Stenosis  Type 2 Diabetes Melllitus  Hypertension  Anemia  Obstructive Sleep Apnea  History of Cholcystectomy  History of Tubal Ligation  BMI 41    Procedure: Cold Knife Cone    Surgeon: Dr. Ruth Candelario DO     Assistant(s): Melissa Garcia MD, PGY1    Anesthesia: general via ETT    Findings:  Normal appearing external genitalia and vaginal mucosa, stenotic external cervical os  Total IV fluids/Blood products:  600 mL crystalloid  Urine Output:  Patient voided spontaneously prior to procedure. Estimated blood loss:  5 mL  Drains:  none  Specimens:  Endocervical Curettage, Ectocervix cut open at 12oclock  Instrument and Sponge Count: Correct  Complications:  none  Condition:  stable, transferred to post anesthesia recovery    Melissa Garcia MD  Ob/Gyn Resident  2023, 9:21 AM    Date: 2023  Time: 1:35 PM      Patient Name: Azar Edwards  Patient : 1965  Room/Bed: [address] [place] [address] RM/NONE  Admission Date/Time: 2023  6:49 AM        Attending Physician Statement  I have discussed the care of Azar Edwards, including pertinent history and exam findings with the resident. I have reviewed and edited their note in the electronic medical record. The key elements of all parts of the encounter have been performed/reviewed by me . I agree with the assessment, plan and orders as documented by the resident.      The level of care submitted represents to the best of my ability the care documented in the medical record today. GC Modifier. This service has been performed in part by a resident under the direction of a teaching physician.         Attending's Name:  Jami Cook DO

## 2023-08-28 NOTE — H&P
OB/GYN Pre-Op H&P  [address]    Patient Name: Keven Malik     Patient : 1965  Room/Bed: [facility] OR [facility]/NONE  Admission Date/Time: 2023  6:49 AM  Primary Care Physician: Odessa Zepeda MD  MRN: [id number]    Date: 2023  Time: 8:09 AM    The patient was seen in pre-op holding. She is here for previously scheduled Cold Knife Cone. Patient has a history of abnormal PAP smear, with normal PAP though was HPV 18+ on 23. She has a history of persistent HPV with last Pap also being normal and positive for other high risk HPV in . Patient had a colposcopy in office on 23 that showed no evidence of PAVEL with unsuccessful ECC due to cervical stenosis. The procedure risks and complications were reviewed. The labs, Consent, and H&P were reviewed and updated. The patient was counseled on the possibility of  the need of a second surgery. The patient voiced understanding and had all of her questions answered. The possibility of incomplete removal of abnormal tissue was discussed. OBSTETRICAL HISTORY:   OB History    Para Term  AB Living   2 1 1 0 1 1   SAB IAB Ectopic Molar Multiple Live Births   0 1 0 0 0 0      # Outcome Date GA Lbr Abraham/2nd Weight Sex Delivery Anes PTL Lv   2 Term 92    M CS-Unspec      1 IAB                PAST MEDICAL HISTORY:   has a past medical history of Diabetes mellitus ([address]), Hyperlipidemia, Hypertension, Murmur, Obesity, MACHELLE on CPAP, and Wellness examination. PAST SURGICAL HISTORY:   has a past surgical history that includes  section (); Cholecystectomy (); and Tubal ligation (Bilateral, ). ALLERGIES:  Allergies as of 2023 - Fully Reviewed 2023   Allergen Reaction Noted    Codeine  2012    Dicloxacillin  2016    Pcn [penicillins]  2012       MEDICATIONS:  No current facility-administered medications for this encounter.        FAMILY HISTORY:  family history all supporting surgical documentation will be forwarded to the pre-operative holding area. The patient is aware that this procedure may not alleviate her symptoms. That there may be a necessity for a second surgery and that there may be an incomplete removal of abnormal tissue.     Marco A Chisholm MD  Ob/Gyn Resident   [address] [place] [address]  8/28/2023, 8:09 AM

## 2023-08-28 NOTE — DISCHARGE INSTRUCTIONS
No alcoholic beverages, no driving or operating machinery, no making important decisions for 24 hours. You may have a normal diet but should eat lightly day of surgery. Drink plenty of fluids.   Urinate within 8 hours after surgery, if unable to urinate call your doctor Call your doctor for the following:   Chills

## 2023-08-28 NOTE — OP NOTE
Operative Note  Department of Obstetrics and Gynecology  [facility]       Patient: Joe Washington   : 1965  MRN: [id number]       Acct: [de-identified]   PCP: Verner Crandall, MD  Date of Procedure: 23    Pre-operative Diagnosis: 59 y.o. female   High Risk Cervical HPV 18+  Cervical Stenosis  Type 2 Diabetes Melllitus  Hypertension  Anemia  Obstructive Sleep Apnea  History of Cholcystectomy  History of Tubal Ligation  History of   BMI 41    Post-operative Diagnosis:   High Risk Cervical HPV 18+  Cervical Stenosis  Type 2 Diabetes Melllitus  Hypertension  Anemia  Obstructive Sleep Apnea  History of Cholcystectomy  History of Tubal Ligation  History of   BMI 41    Procedure: Cold Knife Cone    Surgeon: Dr. Pavan Aguila    Assistants: Marilu Kevin MD, PGY1    Anesthesia: general    Indications: Joe Washington is a 59 y.o. with a history of abnormal PAP smear, with normal PAP though was HPV 18+ on 23. She has a history of persistent HPV with last Pap also being normal and positive for other high risk HPV in . Patient had a colposcopy in office on 23 that showed no evidence of PAVEL with unsuccessful ECC due to cervical stenosis. Procedure Details: The patient was seen in the pre-op room. The risks, benefits, complications, treatment options, and expected outcomes were discussed with the patient. The patient concurred with the proposed plan, giving informed consent. The patient was taken to the Operating Room and identified as Joe Washington and the procedure was verified. A Time Out was held and the above information confirmed. After administration of general anesthesia, the patient was placed in the dorsolithotomy position utilizing yellowfin stirrups. The patient was prepped and draped in the usual sterile fashion. Patient voided spontaneously prior to procedure. A weighted speculum was placed into the vagina to visualize the cervix.  The cervix was grasped with a

## 2023-08-29 LAB
EKG ATRIAL RATE: 62 BPM
EKG P AXIS: 60 DEGREES
EKG P-R INTERVAL: 226 MS
EKG Q-T INTERVAL: 394 MS
EKG QRS DURATION: 84 MS
EKG QTC CALCULATION (BAZETT): 399 MS
EKG R AXIS: 15 DEGREES
EKG T AXIS: 12 DEGREES
EKG VENTRICULAR RATE: 62 BPM

## 2023-08-29 PROCEDURE — 93010 ELECTROCARDIOGRAM REPORT: CPT | Performed by: INTERNAL MEDICINE

## 2023-08-30 LAB — SURGICAL PATHOLOGY REPORT: NORMAL

## 2023-09-11 ENCOUNTER — OFFICE VISIT (OUTPATIENT)
Dept: OBGYN | Age: 58
End: 2023-09-11

## 2023-09-11 VITALS
BODY MASS INDEX: 42.52 KG/M2 | HEART RATE: 81 BPM | WEIGHT: 240 LBS | DIASTOLIC BLOOD PRESSURE: 75 MMHG | HEIGHT: 63 IN | SYSTOLIC BLOOD PRESSURE: 166 MMHG

## 2023-09-11 DIAGNOSIS — Z98.890 POSTOPERATIVE STATE: ICD-10-CM

## 2023-09-11 DIAGNOSIS — Z98.890 POST-OPERATIVE STATE: Primary | ICD-10-CM

## 2023-09-11 PROCEDURE — 99024 POSTOP FOLLOW-UP VISIT: CPT | Performed by: STUDENT IN AN ORGANIZED HEALTH CARE EDUCATION/TRAINING PROGRAM

## 2023-09-11 NOTE — PROGRESS NOTES
Attending Physician Statement  I have discussed the care of Manny Iglesias, including pertinent history and exam findings, with the resident. I have reviewed the key elements of all parts of the encounter with the resident. I agree with the assessment, plan and orders as documented by the resident.   (GE Modifier)    Rufus Jean, [facility], [place]  9/11/2023, 4:54 PM

## 2023-09-12 ENCOUNTER — TELEPHONE (OUTPATIENT)
Dept: INTERNAL MEDICINE | Age: 58
End: 2023-09-12

## 2023-09-12 NOTE — PROGRESS NOTES
Postoperative Visit    Kaleb Lopez is a 59 y.o. female , 2 weeks Post Operative s/p CKC on 23    The patient was seen. She has no chief complaint today. She is tolerating oral intake. She denies any dizziness or shortness of breath or chest pain. Her bowels are regular and she denies any urinary tract symptomology. Patient denies headache, nausea, vomiting, fever, chills, abdominal pain, diarrhea, change in color/amount/odor of vaginal discharge, dysuria or, hematuria. Patient Active Problem List   Diagnosis    HTN (hypertension)    Anemia    Obesity    Gout    MACHELLE on CPAP    Cervical high risk HPV (human papillomavirus) test positive    Type 2 diabetes mellitus without complication, with long-term current use of insulin ([address])    S/p CKC 23       Vitals:   Blood pressure (!) 166/75, pulse 81, height 5' 3\" (1.6 m), weight 240 lb (108.9 kg), not currently breastfeeding. Physical Exam:  Chaperone for Intimate Exam: Chaperone was present for entire exam, Chaperone Name: [facility]    General:  awake, alert, cooperative, no apparent distress, and appears stated age, no apparent distress, alert, and cooperative  Lungs:  No increased work of breathing, good air exchange, clear to auscultation bilaterally, no crackles or wheezing  Heart:  Normal apical impulse, regular rate and rhythm, normal S1 and S2, no S3 or S4, and no murmur noted, regular rate and rhythm, and no murmur    Abdomen: Abdomen soft, non-tender.  BS normal. No masses,  No organomegaly  Extremities:  no calf tenderness, non edematous    Assessment:  Kaleb Lopez is a 59 y.o. female , 2 weeks Post Operative s/p CKC on 23   - Doing well, continue routine post operative care   - Meeting all post operative milestones   - Follow up in one year for repeat pap smear    Hypertension   - Denies s/s    - Patient has elevated bp today   -  Follows with her primary care provider    - On Amlodipine   - States that she is

## 2023-09-12 NOTE — TELEPHONE ENCOUNTER
Pt called writer stating her BP has been running high. Review of chart shows SBP has been in 160s-170s. Pt denies headache or symptoms, but is concerned b/c she has been taking medication as directed. Pt had a home wrist cuff that was giving her \"good readings,\" but when she went to the doctor and saw that BP was elevated pt purchased a home arm cuff. Pt states her SBP was in 160s this morning shortly after waking up. Scheduled pt for appt with PCP.

## 2023-09-14 ENCOUNTER — OFFICE VISIT (OUTPATIENT)
Dept: INTERNAL MEDICINE | Age: 58
End: 2023-09-14
Payer: MEDICARE

## 2023-09-14 VITALS
HEART RATE: 83 BPM | DIASTOLIC BLOOD PRESSURE: 86 MMHG | TEMPERATURE: 97.5 F | WEIGHT: 238 LBS | HEIGHT: 63 IN | SYSTOLIC BLOOD PRESSURE: 144 MMHG | BODY MASS INDEX: 42.17 KG/M2

## 2023-09-14 DIAGNOSIS — E78.00 PURE HYPERCHOLESTEROLEMIA: ICD-10-CM

## 2023-09-14 DIAGNOSIS — I10 PRIMARY HYPERTENSION: ICD-10-CM

## 2023-09-14 DIAGNOSIS — G47.33 OSA ON CPAP: ICD-10-CM

## 2023-09-14 DIAGNOSIS — Z99.89 OSA ON CPAP: ICD-10-CM

## 2023-09-14 DIAGNOSIS — Z79.4 TYPE 2 DIABETES MELLITUS WITHOUT COMPLICATION, WITH LONG-TERM CURRENT USE OF INSULIN (HCC): Primary | ICD-10-CM

## 2023-09-14 DIAGNOSIS — E11.9 TYPE 2 DIABETES MELLITUS WITHOUT COMPLICATION, WITH LONG-TERM CURRENT USE OF INSULIN (HCC): Primary | ICD-10-CM

## 2023-09-14 PROCEDURE — 3044F HG A1C LEVEL LT 7.0%: CPT | Performed by: INTERNAL MEDICINE

## 2023-09-14 PROCEDURE — 3074F SYST BP LT 130 MM HG: CPT | Performed by: INTERNAL MEDICINE

## 2023-09-14 PROCEDURE — 3078F DIAST BP <80 MM HG: CPT | Performed by: INTERNAL MEDICINE

## 2023-09-14 PROCEDURE — 99214 OFFICE O/P EST MOD 30 MIN: CPT | Performed by: INTERNAL MEDICINE

## 2023-09-14 RX ORDER — DULAGLUTIDE 1.5 MG/.5ML
1.75 INJECTION, SOLUTION SUBCUTANEOUS WEEKLY
COMMUNITY
End: 2023-09-14 | Stop reason: ALTCHOICE

## 2023-09-14 RX ORDER — AMLODIPINE BESYLATE 10 MG/1
10 TABLET ORAL DAILY
Qty: 90 TABLET | Refills: 1 | Status: SHIPPED | OUTPATIENT
Start: 2023-09-14

## 2023-09-14 RX ORDER — LANCETS 30 GAUGE
1 EACH MISCELLANEOUS 3 TIMES DAILY
Qty: 100 EACH | Refills: 11 | Status: SHIPPED | OUTPATIENT
Start: 2023-09-14

## 2023-09-14 RX ORDER — PEN NEEDLE, DIABETIC 31 GX5/16"
1 NEEDLE, DISPOSABLE MISCELLANEOUS 3 TIMES DAILY
Qty: 100 EACH | Refills: 11 | Status: SHIPPED | OUTPATIENT
Start: 2023-09-14

## 2023-09-14 RX ORDER — LOSARTAN POTASSIUM AND HYDROCHLOROTHIAZIDE 25; 100 MG/1; MG/1
1 TABLET ORAL DAILY
Qty: 90 TABLET | Refills: 1 | Status: SHIPPED | OUTPATIENT
Start: 2023-09-14

## 2023-09-14 RX ORDER — CARVEDILOL 6.25 MG/1
6.25 TABLET ORAL 2 TIMES DAILY
Qty: 60 TABLET | Refills: 3 | Status: SHIPPED | OUTPATIENT
Start: 2023-09-14

## 2023-09-14 RX ORDER — ATORVASTATIN CALCIUM 40 MG/1
40 TABLET, FILM COATED ORAL DAILY
Qty: 90 TABLET | Refills: 1 | Status: SHIPPED | OUTPATIENT
Start: 2023-09-14

## 2023-09-14 ASSESSMENT — ENCOUNTER SYMPTOMS
COUGH: 0
PHOTOPHOBIA: 0
ABDOMINAL PAIN: 0
SHORTNESS OF BREATH: 0
CONSTIPATION: 0
WHEEZING: 0

## 2023-09-14 NOTE — PROGRESS NOTES
[facility]    Progress Note    Date of patient's visit: 9/14/2023    Patient's Name:  Tamara Ritter    YOB: 1965            Patient Care Team:  Josue Weinberg MD as PCP - General (Internal Medicine)  Josue Weinberg MD as PCP - Empaneled Provider    REASON FOR VISIT: Routine outpatient follow     Chief Complaint   Patient presents with    Diabetes     Follow up         HISTORY OF PRESENT ILLNESS:    History was obtained from the patient. Tamara Ritter is a 59 y.o. is here for diabetes type 2 and hypertension. Patient brought in her new blood pressure cuff. She says for the last few days her blood pressure at home has been a little high. It was also been reported to be high in gynecology office. She is compliant with medications. She is on Hyzaar and amlodipine. She is agreeable to starting Coreg twice a day. She will continue to check her blood pressures at home and call me with readings weekly    Patient has had this type II. Is well controlled. She is on facility. Unfortunately she says her appetite has not decreased. She feels constantly hungry and snacking. She says she wakes up in the morning feeling 100 pounds. She has not lost any weight. A1c is controlled. She has not been able to tolerate metformin in the past which causes diarrhea. She agrees to trying a different GLP-1. We will switch her to semaglutide and see if it helps with her hunger and weight gain. She understands the side effects of GLP-1. No family history or personal history of thyroid medullary cancer, men 2 or pancreatic cancer    Patient had labs done in the last few months. Meds are still high with an LDL above goal.  She agrees to increase her statin dose    She is following up with gynecology regularly because of history of abnormal Pap smears.   She recently had cold knife biopsy done                  Past Medical History:   Diagnosis Date    Diabetes mellitus

## 2023-09-21 ENCOUNTER — TELEPHONE (OUTPATIENT)
Dept: INTERNAL MEDICINE | Age: 58
End: 2023-09-21

## 2023-09-21 NOTE — TELEPHONE ENCOUNTER
Received VM from pt this morning requesting a call back re: her BP. PC to pt, she states she spot-checks her BP every couple of days. On the 17th in left arm it was 123/63-72; right arm 133/83-69. This morning in left arm it was 125/61-68; in right arm 149/89-69. Pt states she waited about 20-30 minutes and retook her BP again, left arm was 101/56-68. Pt has taken all of her medication this morning which included amlodipine 10 mg, carvedilol 6.25 mg, and losartan-HCTZ for BP meds. Pt had a near-syncopal episode this morning before taking her medication. Writer advised drinking plenty of fluids today and to recheck her BP in a couple hours then again this evening. Pt to call writer tomorrow morning if BP still on lower side or if she experiences any additional dizzy spells to schedule a same day appt.

## 2023-09-22 NOTE — TELEPHONE ENCOUNTER
Pt called in, states she has had no further dizzy spells since speaking with writer yesterday. States she drank plenty of water yesterday. BP results from this mornin:  Left 110/57-75  Right 118/67-75    1000:   Left 118/59-78  Right 413/58-07    Pt will contact office/writer next week if any episodes occur over the weekend.

## 2023-09-27 ENCOUNTER — OFFICE VISIT (OUTPATIENT)
Dept: OBGYN | Age: 58
End: 2023-09-27
Payer: MEDICARE

## 2023-09-27 VITALS
HEART RATE: 79 BPM | WEIGHT: 236 LBS | BODY MASS INDEX: 41.81 KG/M2 | SYSTOLIC BLOOD PRESSURE: 138 MMHG | DIASTOLIC BLOOD PRESSURE: 81 MMHG

## 2023-09-27 DIAGNOSIS — R87.810 CERVICAL HIGH RISK HPV (HUMAN PAPILLOMAVIRUS) TEST POSITIVE: ICD-10-CM

## 2023-09-27 DIAGNOSIS — Z98.890 POST-OPERATIVE STATE: Primary | ICD-10-CM

## 2023-09-27 DIAGNOSIS — Z98.890 POSTOPERATIVE STATE: ICD-10-CM

## 2023-09-27 PROCEDURE — 99213 OFFICE O/P EST LOW 20 MIN: CPT | Performed by: STUDENT IN AN ORGANIZED HEALTH CARE EDUCATION/TRAINING PROGRAM

## 2023-09-27 PROCEDURE — 3074F SYST BP LT 130 MM HG: CPT | Performed by: STUDENT IN AN ORGANIZED HEALTH CARE EDUCATION/TRAINING PROGRAM

## 2023-09-27 PROCEDURE — 3078F DIAST BP <80 MM HG: CPT | Performed by: STUDENT IN AN ORGANIZED HEALTH CARE EDUCATION/TRAINING PROGRAM

## 2023-09-28 NOTE — PROGRESS NOTES
Attending Physician Statement  I have discussed the care of Tamara Ritter, including pertinent history and exam findings,  with the resident. I have seen and examined the patient and the key elements of all parts of the encounter have been performed by me. I agree with the assessment, plan and orders as documented by the resident.   ([address])    Beny Reid DO
MG tablet Take 2 tablets by mouth every 6 hours as needed for Pain Do not take more than 4,000 mg in a 24 hour period. 60 tablet 0    senna (SENOKOT) 8.6 MG tablet Take 1 tablet by mouth 2 times daily 14 tablet 0    Calcium-Vitamin D 600-5 MG-MCG TABS Take 1 tablet by mouth daily (with breakfast) 90 tablet 1    omeprazole (PRILOSEC) 20 MG delayed release capsule Take 1 capsule by mouth daily as needed (GERD) 90 capsule 0    furosemide (LASIX) 20 MG tablet Take 1 tablet by mouth daily as needed (edema) Take daily prn edema 30 tablet 5    nystatin (MYCOSTATIN) 189106 UNIT/GM cream Apply topically daily as needed for Dry Skin 1 each 5    Calcium Citrate-Vitamin D (CALCIUM + VIT D, BARIATRIC ADVANTAGE, CHEWABLE TABLET) Take 1 tablet by mouth daily 30 tablet 5    blood glucose monitor kit and supplies Dispense sufficient amount for indicated testing frequency plus additional to accommodate PRN testing needs. 1 kit 0    blood glucose monitor strips Test 2 times daily 100 strip 11     No current facility-administered medications for this visit. ALLERGIES:  Allergies as of 09/27/2023 - Fully Reviewed 09/27/2023   Allergen Reaction Noted    Codeine Itching 06/27/2012    Dicloxacillin Itching 06/01/2016    Pcn [penicillins] Itching 06/27/2012                                   VITALS:  Vitals:    09/27/23 1350   BP: 138/81   Pulse: 79   Weight: 236 lb (107 kg)                                                                                                                                                                         PHYSICAL EXAM:   Chaperone for Intimate Exam: Chaperone was present for entire exam, Chaperone Name: Alfredo Medina MA    General Appearance: Appears healthy. Alert; in no acute distress. Pleasant.   Respiratory: Normal respiratory effort, no conversational dyspnea  Cardiovascular: Regular rate  Abdomen: soft, non-tender, non-distended  Pelvic Exam:   External genitalia: General appearance; normal, Hair

## 2023-10-31 ENCOUNTER — OFFICE VISIT (OUTPATIENT)
Dept: INTERNAL MEDICINE | Age: 58
End: 2023-10-31
Payer: MEDICARE

## 2023-10-31 ENCOUNTER — HOSPITAL ENCOUNTER (OUTPATIENT)
Age: 58
Setting detail: SPECIMEN
Discharge: HOME | End: 2023-10-31

## 2023-10-31 VITALS
TEMPERATURE: 97.6 F | DIASTOLIC BLOOD PRESSURE: 89 MMHG | HEIGHT: 63 IN | WEIGHT: 246 LBS | BODY MASS INDEX: 43.59 KG/M2 | SYSTOLIC BLOOD PRESSURE: 136 MMHG | OXYGEN SATURATION: 96 %

## 2023-10-31 DIAGNOSIS — R35.0 FREQUENT URINATION: ICD-10-CM

## 2023-10-31 DIAGNOSIS — N30.00 ACUTE CYSTITIS WITHOUT HEMATURIA: Primary | ICD-10-CM

## 2023-10-31 DIAGNOSIS — N76.0 ACUTE VAGINITIS: ICD-10-CM

## 2023-10-31 LAB
BILIRUBIN, POC: ABNORMAL
BLOOD URINE, POC: ABNORMAL
CLARITY, POC: ABNORMAL
COLOR, POC: YELLOW
GLUCOSE URINE, POC: NEGATIVE
KETONES, POC: NEGATIVE
LEUKOCYTE EST, POC: ABNORMAL
NITRITE, POC: NEGATIVE
PH, POC: 6
PROTEIN, POC: ABNORMAL
SPECIFIC GRAVITY, POC: 1.03
UROBILINOGEN, POC: 0.2

## 2023-10-31 PROCEDURE — 99214 OFFICE O/P EST MOD 30 MIN: CPT

## 2023-10-31 PROCEDURE — 81002 URINALYSIS NONAUTO W/O SCOPE: CPT

## 2023-10-31 PROCEDURE — 3078F DIAST BP <80 MM HG: CPT

## 2023-10-31 PROCEDURE — 3074F SYST BP LT 130 MM HG: CPT

## 2023-10-31 RX ORDER — METRONIDAZOLE 500 MG/1
500 TABLET ORAL 2 TIMES DAILY
Qty: 14 TABLET | Refills: 0 | Status: SHIPPED | OUTPATIENT
Start: 2023-10-31 | End: 2023-11-07

## 2023-10-31 RX ORDER — DULAGLUTIDE 1.5 MG/.5ML
INJECTION, SOLUTION SUBCUTANEOUS
COMMUNITY
Start: 2023-09-21

## 2023-10-31 RX ORDER — CIPROFLOXACIN 500 MG/1
500 TABLET, FILM COATED ORAL 2 TIMES DAILY
Qty: 14 TABLET | Refills: 0 | Status: SHIPPED | OUTPATIENT
Start: 2023-10-31 | End: 2023-11-07

## 2023-10-31 ASSESSMENT — ENCOUNTER SYMPTOMS
DIARRHEA: 0
BACK PAIN: 0
VOMITING: 0
ABDOMINAL PAIN: 0
CONSTIPATION: 0
NAUSEA: 0

## 2023-10-31 NOTE — PROGRESS NOTES
Attending Physician Statement  I have discussed the care of Roland Lopez including pertinent history and exam findings,  with the resident. I have reviewed the key elements of all parts of the encounter with the resident. I agree with the assessment, plan and orders as documented by the resident.   (GE Modifier)    MD CASH Garcia  Attending Physician, [address] [place] [address] Internal Medicine Residency Program  [address] [facility]  10/31/2023, 10:12 AM

## 2023-10-31 NOTE — PROGRESS NOTES
n    [address]  [address] [facility]  [address] [facility] [address]  Dept: [phone]  Dept Fax: [phone]    Office Progress/Follow Up Note  Date ofpatient's visit: 10/31/2023  Patient's Name:  Jo-Ann Hylton YOB: 1965            Patient Care Team:  Hua Cooley MD as PCP - General (Internal Medicine)  Hua Cooley MD as PCP - Empaneled Provider  Dilip Scott RN as Ambulatory Care Manager  ================================================================    REASON FOR VISIT/CHIEF COMPLAINT:  Urinary Frequency (Frequent urination, pressure and a yellow discharge x2 weeks ) and Health Maintenance (Flu vaccine declined.)    HISTORY OF PRESENTING ILLNESS:  History was obtained from: patient, electronic medical record. Darrell Nielsen a 59 y.o. is here for a acute care visit      Patient reports that recently she had a CKC (cervical cone biopsy) in August for persistent HPV and her Pap smears, postop she had vaginal spotting, bleeding saw OB/GYN. Patient reports that for the past couple weeks she is having of urinary frequency, dysuria, pressure while urination, yellowish vaginal discharge. Denies any fevers or chills, denies any flank pain. Reports that after many years she has a new partner now and she had sexual intercourse in the past couple weeks. Denies any rash or lesions around her vaginal area.     Pathology from her CKC showed benign cervical mucosa      Patient Active Problem List   Diagnosis    HTN (hypertension)    Anemia    Obesity    Gout    MACHELLE on CPAP    Cervical high risk HPV (human papillomavirus) test positive    Type 2 diabetes mellitus without complication, with long-term current use of insulin ([address])    S/p CKC 8/28/23       Health Maintenance Due   Topic Date Due    Pneumococcal 0-64 years Vaccine (1 - PCV) Never done    Diabetic foot exam  Never done    Diabetic retinal exam  Never done    DTaP/Tdap/Td vaccine (1 - Tdap) Never

## 2023-11-01 DIAGNOSIS — N76.0 ACUTE VAGINITIS: ICD-10-CM

## 2023-11-01 DIAGNOSIS — N30.00 ACUTE CYSTITIS WITHOUT HEMATURIA: ICD-10-CM

## 2023-11-01 LAB
CANDIDA SPECIES: NEGATIVE
GARDNERELLA VAGINALIS: NEGATIVE
MICROORGANISM SPEC CULT: NORMAL
SOURCE: ABNORMAL
SPECIMEN DESCRIPTION: NORMAL
TRICHOMONAS: POSITIVE

## 2023-11-02 LAB
C TRACH DNA SPEC QL PROBE+SIG AMP: NEGATIVE
MICROORGANISM SPEC CULT: NORMAL
N GONORRHOEA DNA SPEC QL PROBE+SIG AMP: NEGATIVE
SPECIMEN DESCRIPTION: NORMAL
SPECIMEN DESCRIPTION: NORMAL

## 2023-11-08 DIAGNOSIS — N76.0 VAGINOSIS: Primary | ICD-10-CM

## 2023-11-08 RX ORDER — METRONIDAZOLE 500 MG/1
500 TABLET ORAL 2 TIMES DAILY
Qty: 14 TABLET | Refills: 0 | Status: SHIPPED | OUTPATIENT
Start: 2023-11-08 | End: 2023-11-15

## 2023-11-16 SDOH — HEALTH STABILITY: PHYSICAL HEALTH
ON AVERAGE, HOW MANY DAYS PER WEEK DO YOU ENGAGE IN MODERATE TO STRENUOUS EXERCISE (LIKE A BRISK WALK)?: PATIENT DECLINED

## 2023-11-16 SDOH — HEALTH STABILITY: PHYSICAL HEALTH: ON AVERAGE, HOW MANY MINUTES DO YOU ENGAGE IN EXERCISE AT THIS LEVEL?: 0 MIN

## 2023-11-16 ASSESSMENT — LIFESTYLE VARIABLES
HOW OFTEN DO YOU HAVE SIX OR MORE DRINKS ON ONE OCCASION: 1
HOW MANY STANDARD DRINKS CONTAINING ALCOHOL DO YOU HAVE ON A TYPICAL DAY: 1 OR 2
HOW MANY STANDARD DRINKS CONTAINING ALCOHOL DO YOU HAVE ON A TYPICAL DAY: 1
HOW OFTEN DO YOU HAVE A DRINK CONTAINING ALCOHOL: MONTHLY OR LESS
HOW OFTEN DO YOU HAVE A DRINK CONTAINING ALCOHOL: 2

## 2023-11-16 ASSESSMENT — PATIENT HEALTH QUESTIONNAIRE - PHQ9
SUM OF ALL RESPONSES TO PHQ QUESTIONS 1-9: 0
SUM OF ALL RESPONSES TO PHQ QUESTIONS 1-9: 0
SUM OF ALL RESPONSES TO PHQ9 QUESTIONS 1 & 2: 0
SUM OF ALL RESPONSES TO PHQ QUESTIONS 1-9: 0
2. FEELING DOWN, DEPRESSED OR HOPELESS: 0
1. LITTLE INTEREST OR PLEASURE IN DOING THINGS: 0
SUM OF ALL RESPONSES TO PHQ QUESTIONS 1-9: 0

## 2023-11-17 ENCOUNTER — OFFICE VISIT (OUTPATIENT)
Dept: INTERNAL MEDICINE | Age: 58
End: 2023-11-17
Payer: MEDICARE

## 2023-11-17 VITALS
BODY MASS INDEX: 43.38 KG/M2 | HEART RATE: 68 BPM | WEIGHT: 244.8 LBS | HEIGHT: 63 IN | DIASTOLIC BLOOD PRESSURE: 62 MMHG | SYSTOLIC BLOOD PRESSURE: 118 MMHG

## 2023-11-17 DIAGNOSIS — Z00.00 ENCOUNTER FOR INITIAL ANNUAL WELLNESS VISIT (AWV) IN MEDICARE PATIENT: Primary | ICD-10-CM

## 2023-11-17 PROCEDURE — 3078F DIAST BP <80 MM HG: CPT | Performed by: INTERNAL MEDICINE

## 2023-11-17 PROCEDURE — 3074F SYST BP LT 130 MM HG: CPT | Performed by: INTERNAL MEDICINE

## 2023-11-17 PROCEDURE — G0438 PPPS, INITIAL VISIT: HCPCS | Performed by: INTERNAL MEDICINE

## 2023-11-17 NOTE — PATIENT INSTRUCTIONS
Advance Directives: Care Instructions  Overview  An advance directive is a legal way to state your wishes at the end of your life. It tells your family and your doctor what to do if you can't say what you want. There are two main types of advance directives. You can change them any time your wishes change. Living will. This form tells your family and your doctor your wishes about life support and other treatment. The form is also called a declaration. Medical power of . This form lets you name a person to make treatment decisions for you when you can't speak for yourself. This person is called a health care agent (health care proxy, health care surrogate). The form is also called a durable power of  for health care. If you do not have an advance directive, decisions about your medical care may be made by a family member, or by a doctor or a  who doesn't know you. It may help to think of an advance directive as a gift to the people who care for you. If you have one, they won't have to make tough decisions by themselves. For more information, including forms for your state, see the [address] [place] [address] website (www.caringinfo.org/planning/advance-directives/). Follow-up care is a key part of your treatment and safety. Be sure to make and go to all appointments, and call your doctor if you are having problems. It's also a good idea to know your test results and keep a list of the medicines you take. What should you include in an advance directive? Many states have a unique advance directive form. (It may ask you to address specific issues.) Or you might use a universal form that's approved by many states. If your form doesn't tell you what to address, it may be hard to know what to include in your advance directive. Use the questions below to help you get started. Who do you want to make decisions about your medical care if you are not able to?   What life-support measures do you want if you

## 2023-12-13 ENCOUNTER — OFFICE VISIT (OUTPATIENT)
Dept: INTERNAL MEDICINE | Age: 58
End: 2023-12-13
Payer: MEDICARE

## 2023-12-13 VITALS
BODY MASS INDEX: 43.27 KG/M2 | WEIGHT: 244.2 LBS | TEMPERATURE: 97.5 F | HEART RATE: 78 BPM | DIASTOLIC BLOOD PRESSURE: 77 MMHG | SYSTOLIC BLOOD PRESSURE: 135 MMHG | HEIGHT: 63 IN | OXYGEN SATURATION: 97 %

## 2023-12-13 DIAGNOSIS — A08.4 VIRAL GASTROENTERITIS: ICD-10-CM

## 2023-12-13 DIAGNOSIS — J06.9 VIRAL URI WITH COUGH: Primary | ICD-10-CM

## 2023-12-13 PROCEDURE — 99211 OFF/OP EST MAY X REQ PHY/QHP: CPT | Performed by: STUDENT IN AN ORGANIZED HEALTH CARE EDUCATION/TRAINING PROGRAM

## 2023-12-13 PROCEDURE — 3078F DIAST BP <80 MM HG: CPT

## 2023-12-13 PROCEDURE — 3074F SYST BP LT 130 MM HG: CPT

## 2023-12-13 PROCEDURE — 99213 OFFICE O/P EST LOW 20 MIN: CPT

## 2023-12-13 RX ORDER — LOSARTAN POTASSIUM AND HYDROCHLOROTHIAZIDE 25; 100 MG/1; MG/1
1 TABLET ORAL DAILY
Qty: 90 TABLET | Refills: 1 | Status: CANCELLED | OUTPATIENT
Start: 2023-12-13

## 2023-12-13 RX ORDER — PSEUDOEPHEDRINE HCL 30 MG
30 TABLET ORAL EVERY 6 HOURS PRN
Qty: 20 TABLET | Refills: 0 | Status: SHIPPED | OUTPATIENT
Start: 2023-12-13 | End: 2023-12-18

## 2023-12-13 RX ORDER — CETIRIZINE HYDROCHLORIDE 10 MG/1
10 TABLET ORAL DAILY PRN
Qty: 30 TABLET | Refills: 0 | Status: SHIPPED | OUTPATIENT
Start: 2023-12-13 | End: 2024-01-12

## 2023-12-13 RX ORDER — OXYMETAZOLINE HYDROCHLORIDE 0.05 G/100ML
2 SPRAY NASAL 2 TIMES DAILY
Qty: 2 ML | Refills: 0 | Status: SHIPPED | OUTPATIENT
Start: 2023-12-13 | End: 2023-12-18

## 2023-12-13 RX ORDER — CARVEDILOL 6.25 MG/1
6.25 TABLET ORAL 2 TIMES DAILY
Qty: 60 TABLET | Refills: 3 | Status: CANCELLED | OUTPATIENT
Start: 2023-12-13

## 2023-12-29 ENCOUNTER — TELEPHONE (OUTPATIENT)
Dept: INTERNAL MEDICINE | Age: 58
End: 2023-12-29

## 2023-12-29 NOTE — TELEPHONE ENCOUNTER
Writer received PC from pt stating the pharmacy told her they are waiting on a PA for Ozempic. We never received notification of need for PA from pharmacy. Pt states Ozempic was ordered some months ago but that at that time the medication was on backorder. Pt states pharmacist told her they now have the medication but need a PA. Per chart review pt has been on Trulicity, N3D is well-controlled. Very doubtful pt will be approved for Ozempic, but writer completed PA on [email or web address]. Received immediate response that pt does not need PA for medication - PA request was submitted for 3 mL per 30 days. Script is written to dispense 12 mL and this may be causing the problem. PC to pharmacy, spoke with pharmacist. He states they re-ran the script for 9 mLs which is an 84 day supply and that went through no issues with no copay. PC to pt to let her know Casimiro Rogers is ready to . Discussed starting with 0.25 mg dose for 4 weeks to allow body to acclimate to medication, then increase to 0.5 mg. Pt states she may bring pen to office for writer to advise in person.

## 2024-02-13 ENCOUNTER — OFFICE VISIT (OUTPATIENT)
Dept: INTERNAL MEDICINE | Age: 59
End: 2024-02-13
Payer: MEDICARE

## 2024-02-13 VITALS
OXYGEN SATURATION: 98 % | BODY MASS INDEX: 43.8 KG/M2 | HEART RATE: 69 BPM | WEIGHT: 247.2 LBS | DIASTOLIC BLOOD PRESSURE: 70 MMHG | TEMPERATURE: 97.4 F | SYSTOLIC BLOOD PRESSURE: 118 MMHG | HEIGHT: 63 IN

## 2024-02-13 DIAGNOSIS — E11.9 TYPE 2 DIABETES MELLITUS WITHOUT COMPLICATION, WITH LONG-TERM CURRENT USE OF INSULIN (HCC): Primary | ICD-10-CM

## 2024-02-13 DIAGNOSIS — K21.9 GASTROESOPHAGEAL REFLUX DISEASE, UNSPECIFIED WHETHER ESOPHAGITIS PRESENT: ICD-10-CM

## 2024-02-13 DIAGNOSIS — E78.00 PURE HYPERCHOLESTEROLEMIA: ICD-10-CM

## 2024-02-13 DIAGNOSIS — Z79.4 TYPE 2 DIABETES MELLITUS WITHOUT COMPLICATION, WITH LONG-TERM CURRENT USE OF INSULIN (HCC): Primary | ICD-10-CM

## 2024-02-13 DIAGNOSIS — I10 PRIMARY HYPERTENSION: ICD-10-CM

## 2024-02-13 DIAGNOSIS — J06.9 VIRAL URI WITH COUGH: ICD-10-CM

## 2024-02-13 DIAGNOSIS — Z12.31 BREAST CANCER SCREENING BY MAMMOGRAM: ICD-10-CM

## 2024-02-13 LAB — HBA1C MFR BLD: 6.5 %

## 2024-02-13 PROCEDURE — 3074F SYST BP LT 130 MM HG: CPT | Performed by: INTERNAL MEDICINE

## 2024-02-13 PROCEDURE — 83036 HEMOGLOBIN GLYCOSYLATED A1C: CPT | Performed by: INTERNAL MEDICINE

## 2024-02-13 PROCEDURE — 99214 OFFICE O/P EST MOD 30 MIN: CPT | Performed by: INTERNAL MEDICINE

## 2024-02-13 PROCEDURE — 3044F HG A1C LEVEL LT 7.0%: CPT | Performed by: INTERNAL MEDICINE

## 2024-02-13 PROCEDURE — 99213 OFFICE O/P EST LOW 20 MIN: CPT

## 2024-02-13 PROCEDURE — 3078F DIAST BP <80 MM HG: CPT | Performed by: INTERNAL MEDICINE

## 2024-02-13 RX ORDER — ATORVASTATIN CALCIUM 40 MG/1
40 TABLET, FILM COATED ORAL DAILY
Qty: 90 TABLET | Refills: 1 | Status: SHIPPED | OUTPATIENT
Start: 2024-02-13

## 2024-02-13 RX ORDER — SEMAGLUTIDE 1.34 MG/ML
1 INJECTION, SOLUTION SUBCUTANEOUS WEEKLY
Qty: 4 ADJUSTABLE DOSE PRE-FILLED PEN SYRINGE | Refills: 3 | Status: SHIPPED | OUTPATIENT
Start: 2024-02-13

## 2024-02-13 RX ORDER — LOSARTAN POTASSIUM AND HYDROCHLOROTHIAZIDE 25; 100 MG/1; MG/1
1 TABLET ORAL DAILY
Qty: 90 TABLET | Refills: 1 | Status: SHIPPED | OUTPATIENT
Start: 2024-02-13

## 2024-02-13 RX ORDER — CARVEDILOL 6.25 MG/1
6.25 TABLET ORAL 2 TIMES DAILY
Qty: 180 TABLET | Refills: 1 | Status: SHIPPED | OUTPATIENT
Start: 2024-02-13

## 2024-02-13 RX ORDER — AMLODIPINE BESYLATE 10 MG/1
10 TABLET ORAL DAILY
Qty: 90 TABLET | Refills: 1 | Status: SHIPPED | OUTPATIENT
Start: 2024-02-13

## 2024-02-13 RX ORDER — OMEPRAZOLE 20 MG/1
20 CAPSULE, DELAYED RELEASE ORAL DAILY PRN
Qty: 90 CAPSULE | Refills: 0 | Status: SHIPPED | OUTPATIENT
Start: 2024-02-13

## 2024-02-13 NOTE — PROGRESS NOTES
CREATININE 0.6 08/28/2023 08:26 AM    CREATININE 0.76 02/23/2022 11:50 AM    GLUCOSE 121 02/23/2022 11:50 AM    GLUCOSE 119 04/29/2012 10:27 PM     HEMOGLOBIN A1C:   Lab Results   Component Value Date/Time    LABA1C 5.7 05/02/2023 10:20 AM     MICROALBUMIN URINE:   Lab Results   Component Value Date/Time    MICROALBUR <12 05/02/2023 01:49 PM     FASTING LIPID PANEL:  Lab Results   Component Value Date    CHOL 174 05/02/2023    HDL 37 (L) 05/02/2023    TRIG 115 05/02/2023     Lab Results   Component Value Date    LDLCHOLESTEROL 114 05/02/2023       LIVER PROFILE:  Lab Results   Component Value Date/Time    ALT 21 02/23/2022 11:50 AM    AST 20 02/23/2022 11:50 AM    PROT 8.0 02/23/2022 11:50 AM    BILITOT 0.70 02/23/2022 11:50 AM    BILIDIR 0.10 08/08/2016 04:43 PM    LABALBU 4.1 02/23/2022 11:50 AM      THYROID FUNCTION:   Lab Results   Component Value Date/Time    TSH 3.49 02/23/2022 11:50 AM      URINEANALYSIS: No results found for: \"LABURIN\"  ASSESSMENT AND PLAN:    1. Type 2 diabetes mellitus without complication, with long-term current use of insulin (HCC)  Increase Semaglutide  Follow up with Ophthalmology  Diabetic ed      - POCT glycosylated hemoglobin (Hb A1C)  - Comprehensive Metabolic Panel; Future  - Lipid Panel; Future  - Microalbumin / Creatinine Urine Ratio; Future  - atorvastatin (LIPITOR) 40 MG tablet; Take 1 tablet by mouth daily  Dispense: 90 tablet; Refill: 1  - Semaglutide, 1 MG/DOSE, (OZEMPIC, 1 MG/DOSE,) 2 MG/1.5ML SOPN; Inject 1 mg into the skin once a week  Dispense: 4 Adjustable Dose Pre-filled Pen Syringe; Refill: 3  - [facility]    2. Primary hypertension  Controlled  Labs before next visit    - Comprehensive Metabolic Panel; Future  - amLODIPine (NORVASC) 10 MG tablet; Take 1 tablet by mouth daily  Dispense: 90 tablet; Refill: 1  - carvedilol (COREG) 6.25 MG tablet; Take 1 tablet by mouth 2 times daily  Dispense: 180 tablet; Refill: 1  -

## 2024-02-23 ENCOUNTER — HOSPITAL ENCOUNTER (OUTPATIENT)
Dept: DIABETES SERVICES | Age: 59
Setting detail: THERAPIES SERIES
Discharge: HOME | End: 2024-02-23
Payer: MEDICARE

## 2024-02-23 PROCEDURE — G0108 DIAB MANAGE TRN  PER INDIV: HCPCS

## 2024-02-23 SDOH — ECONOMIC STABILITY: FOOD INSECURITY: ADDITIONAL INFORMATION: NO

## 2024-02-23 ASSESSMENT — PROBLEM AREAS IN DIABETES QUESTIONNAIRE (PAID)
PAID-5 TOTAL SCORE: 3
WORRYING ABOUT THE FUTURE AND THE POSSIBILITY OF SERIOUS COMPLICATIONS: 1
FEELING SCARED WHEN YOU THINK ABOUT LIVING WITH DIABETES: 1
FEELING DEPRESSED WHEN YOU THINK ABOUT LIVING WITH DIABETES: 0
COPING WITH COMPLICATIONS OF DIABETES: 0
FEELING THAT DIABETES IS TAKING UP TOO MUCH OF YOUR MENTAL AND PHYSICAL ENERGY EVERY DAY: 1

## 2024-02-23 NOTE — PROGRESS NOTES
Diabetes Self- Management Education Program Assessment -   Also see Diabetic Screening  Patient, Roxie Wallis,  here for diabetes self-management education  visit/ assessment.   Today's visit was in an individual setting.     MEDICAL HISTORY:  Past Medical History:   Diagnosis Date    Diabetes mellitus (HCC)     Hyperlipidemia     Hypertension     Murmur     echo done 5/19/23    Obesity     MACHELLE on CPAP     Wellness examination     Dr Gaffney; last visit 5/2/23     Family History   Problem Relation Age of Onset    Cancer Mother         lung    Diabetes Mother     Breast Cancer Sister 54     Codeine, Dicloxacillin, and Pcn [penicillins]   Immunization History   Administered Date(s) Administered    COVID-19, PFIZER GRAY top, DO NOT Dilute, (age 12 y+), IM, 30 mcg/0.3 mL 06/08/2021, 01/27/2022    COVID-19, PFIZER PURPLE top, DILUTE for use, (age 12 y+), 30mcg/0.3mL 05/10/2021     Current Medications  Current Outpatient Medications   Medication Sig Dispense Refill    amLODIPine (NORVASC) 10 MG tablet Take 1 tablet by mouth daily 90 tablet 1    atorvastatin (LIPITOR) 40 MG tablet Take 1 tablet by mouth daily 90 tablet 1    carvedilol (COREG) 6.25 MG tablet Take 1 tablet by mouth 2 times daily 180 tablet 1    losartan-hydroCHLOROthiazide (HYZAAR) 100-25 MG per tablet Take 1 tablet by mouth daily \ 90 tablet 1    omeprazole (PRILOSEC) 20 MG delayed release capsule Take 1 capsule by mouth daily as needed (GERD) 90 capsule 0    Semaglutide, 1 MG/DOSE, (OZEMPIC, 1 MG/DOSE,) 2 MG/1.5ML SOPN Inject 1 mg into the skin once a week 4 Adjustable Dose Pre-filled Pen Syringe 3    Lancets MISC 1 each by Does not apply route 3 times daily 100 each 11    Alcohol Swabs (ALCOHOL PREP) PADS 1 each by Does not apply route in the morning, at noon, and at bedtime Use one swab to clean your skin before testing blood sugar and before injecting medication 100 each 11    senna (SENOKOT) 8.6 MG tablet Take 1 tablet by mouth 2 times daily 14

## 2024-03-14 DIAGNOSIS — E11.9 TYPE 2 DIABETES MELLITUS WITHOUT COMPLICATION, WITH LONG-TERM CURRENT USE OF INSULIN (HCC): Primary | ICD-10-CM

## 2024-03-14 DIAGNOSIS — Z79.4 TYPE 2 DIABETES MELLITUS WITHOUT COMPLICATION, WITH LONG-TERM CURRENT USE OF INSULIN (HCC): Primary | ICD-10-CM

## 2024-03-14 NOTE — TELEPHONE ENCOUNTER
Received PC from pt stating that she takes her Ozempic on Wednesdays, and on Thursdays she feels like she needs to keep eating. This hunger is noticed throughout the week, but seems to be the worst for the first couple days after taking medication. Pt is concerned that she has gained 3-4 pounds in the last few weeks since increasing the dose of Ozempic. Pt states however that she was also experiencing this with the 0.5 mg dose of Ozempic as well, but feeling more intense hunger. Please advise.

## 2024-03-20 RX ORDER — DULAGLUTIDE 0.75 MG/.5ML
0.75 INJECTION, SOLUTION SUBCUTANEOUS WEEKLY
Qty: 2 ML | Refills: 0 | Status: SHIPPED | OUTPATIENT
Start: 2024-03-20 | End: 2024-04-17

## 2024-03-20 RX ORDER — DULAGLUTIDE 1.5 MG/.5ML
1.5 INJECTION, SOLUTION SUBCUTANEOUS WEEKLY
Qty: 2 ML | Refills: 5 | Status: SHIPPED | OUTPATIENT
Start: 2024-04-17

## 2024-03-20 NOTE — TELEPHONE ENCOUNTER
PC to pharmacy to notify of d/c Ozempic and restart of Trulicity with taper, left secure VM.    PC back to pt to let her know the same, no questions at this time.

## 2024-03-20 NOTE — TELEPHONE ENCOUNTER
Pt does not recall having this issue with Trulicity, but review of PCP note from 9/14/2023 indicates pt was having similar issues at that time and medication was changed to Ozempic to see if it would curb her appetite and help her lose weight.    Pt would like to change back to Trulicity if ok with PCP. Per formulary review, Trulicity is also Tier 3 covered medication. Per dispense report pt was on 1.5 mg dose of Trulicity when it was changed.    New scripts pended, one for 0.75 mg dose for 4 weeks with no refills, and second for 1.5 mg dose to start in 4 weeks and continue on after that.

## 2024-04-15 DIAGNOSIS — E11.9 TYPE 2 DIABETES MELLITUS WITHOUT COMPLICATION, WITH LONG-TERM CURRENT USE OF INSULIN (HCC): ICD-10-CM

## 2024-04-15 DIAGNOSIS — Z79.4 TYPE 2 DIABETES MELLITUS WITHOUT COMPLICATION, WITH LONG-TERM CURRENT USE OF INSULIN (HCC): ICD-10-CM

## 2024-04-16 NOTE — TELEPHONE ENCOUNTER
A Refill Has Been Requested for Mendezlindsey AGUSTO Wallis    Medication Requested  Requested Prescriptions     Pending Prescriptions Disp Refills    TRULICITY 0.75 MG/0.5ML SOPN [Pharmacy Med Name: TRULICITY 0.75 MG/0.5 ML PEN] 2 mL 0     Sig: INJECT 0.75 MG UNDER THE SKIN ONCE WEEKLY       Last Visit Date (If Applicable)  2/13/2024    Next Visit Date (If Applicable)  Visit date not found

## 2024-04-17 RX ORDER — DULAGLUTIDE 0.75 MG/.5ML
INJECTION, SOLUTION SUBCUTANEOUS
Qty: 2 ML | Refills: 2 | Status: SHIPPED | OUTPATIENT
Start: 2024-04-17

## 2024-04-22 DIAGNOSIS — E11.9 TYPE 2 DIABETES MELLITUS WITHOUT COMPLICATION, WITH LONG-TERM CURRENT USE OF INSULIN (HCC): Primary | ICD-10-CM

## 2024-04-22 DIAGNOSIS — Z79.4 TYPE 2 DIABETES MELLITUS WITHOUT COMPLICATION, WITH LONG-TERM CURRENT USE OF INSULIN (HCC): Primary | ICD-10-CM

## 2024-04-22 NOTE — TELEPHONE ENCOUNTER
Received VM from pt stating Trulicity is on back order, pt wondering if she should go back to Ozempic despite side effects.    PC to pt, she has concerns about restarting Ozempic, but knows PCP would want her on something vs nothing to control blood sugar. Pt states she has only ever tried metformin and is unable to tolerate side effects, but has not tried any other oral medications. Pt is very agreeable to trying an oral medication.    Formulary medications include:  Januvia  Jardiance  Glyxambi  Pioglitazone  Tradjenta    Script for Glyxambi pended, please alter as appropriate.

## 2024-04-25 DIAGNOSIS — E58 CALCIUM DEFICIENCY: ICD-10-CM

## 2024-04-25 DIAGNOSIS — E55.9 VITAMIN D DEFICIENCY: ICD-10-CM

## 2024-04-25 RX ORDER — ASPIRIN 81 MG
1 TABLET, DELAYED RELEASE (ENTERIC COATED) ORAL DAILY
Qty: 90 TABLET | Refills: 0 | Status: SHIPPED | OUTPATIENT
Start: 2024-04-25

## 2024-04-25 NOTE — TELEPHONE ENCOUNTER
A Refill Has Been Requested for Roxie Wallis    Medication Requested  Requested Prescriptions     Pending Prescriptions Disp Refills    calcium carb-cholecalciferol 600-5 MG-MCG TABS tablet [Pharmacy Med Name: CALCIUM 600 MG-VIT D3 5 MCG TB] 90 tablet      Sig: TAKE 1 TABLET BY MOUTH DAILY WITH BREAKFAST       Last Visit Date (If Applicable)  2/13/2024    Next Visit Date (If Applicable)  Visit date not found

## 2024-05-01 DIAGNOSIS — N76.0 VAGINOSIS: ICD-10-CM

## 2024-05-01 RX ORDER — METRONIDAZOLE 500 MG/1
TABLET ORAL
Qty: 14 TABLET | Refills: 0 | OUTPATIENT
Start: 2024-05-01

## 2024-05-06 ENCOUNTER — TELEPHONE (OUTPATIENT)
Dept: INTERNAL MEDICINE | Age: 59
End: 2024-05-06

## 2024-05-06 NOTE — TELEPHONE ENCOUNTER
Writer received PC from pt stating she went to  her medications over the weekend, and when she got home they had given her the new oral medication, Trulicity, and Ozempic. Pt is not sure what dose of Trulicity was given, writer shows 2 different doses listed in pt's chart. Writer advised pt to only take oral medication, which is what pt did. Pt states pharmacy will not let her return the medication, but she doesn't want to get in trouble for insurance fraud. Writer let pt know it is not her fault the Ozempic was dispensed, shows discontinued on our end. Trulicity will be discontinued at this time as pt has switched to daily oral medication for glucose control. PC to pharmacy to notify. Writer advised pt to discard medication or donate to organization that assists pts with medications.

## 2024-06-06 ENCOUNTER — CARE COORDINATION (OUTPATIENT)
Dept: CARE COORDINATION | Age: 59
End: 2024-06-06

## 2024-06-06 NOTE — CARE COORDINATION
Called, message left on voicemail with my contact information and reason for call. Will F/U 1 week if no return call today    HTN  DM    My Chart Message sent  Introduction letter Sent

## 2024-06-06 NOTE — CARE COORDINATION
Return call from patient, Introduced self and reason for call. She states that she is doing well and doesn't really need help with anything. She did ask about getting a refill on Empagliflozin. Medication review shows that initial order was  on 5/2 with 5 refills. I instructed her to call her pharmacy and request the refill and to call me back if she has any difficulties.  Will remove from Needs to enroll

## 2024-06-07 ENCOUNTER — TELEPHONE (OUTPATIENT)
Dept: INTERNAL MEDICINE | Age: 59
End: 2024-06-07

## 2024-06-07 NOTE — TELEPHONE ENCOUNTER
Writer received VM from pt stating there are no refills on her new medication, Glyxambi.    PC to pharmacy script was sent to, confirmed pt has refills and that medication was ordered. Pharmacist states medication was received this morning, suggests pt wait until mid-late afternoon to .     PC to pt, no answer. Left HIPAA compliant message identifying self and nature of call, requested call back to writer, phone number given.

## 2024-07-30 SDOH — ECONOMIC STABILITY: FOOD INSECURITY: WITHIN THE PAST 12 MONTHS, THE FOOD YOU BOUGHT JUST DIDN'T LAST AND YOU DIDN'T HAVE MONEY TO GET MORE.: NEVER TRUE

## 2024-07-30 SDOH — ECONOMIC STABILITY: INCOME INSECURITY: HOW HARD IS IT FOR YOU TO PAY FOR THE VERY BASICS LIKE FOOD, HOUSING, MEDICAL CARE, AND HEATING?: NOT HARD AT ALL

## 2024-07-30 SDOH — ECONOMIC STABILITY: FOOD INSECURITY: WITHIN THE PAST 12 MONTHS, YOU WORRIED THAT YOUR FOOD WOULD RUN OUT BEFORE YOU GOT MONEY TO BUY MORE.: NEVER TRUE

## 2024-07-30 SDOH — HEALTH STABILITY: PHYSICAL HEALTH: ON AVERAGE, HOW MANY DAYS PER WEEK DO YOU ENGAGE IN MODERATE TO STRENUOUS EXERCISE (LIKE A BRISK WALK)?: 0 DAYS

## 2024-07-30 SDOH — HEALTH STABILITY: PHYSICAL HEALTH: ON AVERAGE, HOW MANY MINUTES DO YOU ENGAGE IN EXERCISE AT THIS LEVEL?: 0 MIN

## 2024-07-30 ASSESSMENT — PATIENT HEALTH QUESTIONNAIRE - PHQ9
2. FEELING DOWN, DEPRESSED OR HOPELESS: NOT AT ALL
SUM OF ALL RESPONSES TO PHQ9 QUESTIONS 1 & 2: 0
SUM OF ALL RESPONSES TO PHQ QUESTIONS 1-9: 0
1. LITTLE INTEREST OR PLEASURE IN DOING THINGS: NOT AT ALL
SUM OF ALL RESPONSES TO PHQ QUESTIONS 1-9: 0

## 2024-07-30 ASSESSMENT — LIFESTYLE VARIABLES
HOW OFTEN DO YOU HAVE A DRINK CONTAINING ALCOHOL: MONTHLY OR LESS
HOW OFTEN DO YOU HAVE A DRINK CONTAINING ALCOHOL: 2
HOW MANY STANDARD DRINKS CONTAINING ALCOHOL DO YOU HAVE ON A TYPICAL DAY: 1
HOW MANY STANDARD DRINKS CONTAINING ALCOHOL DO YOU HAVE ON A TYPICAL DAY: 1 OR 2
HOW OFTEN DO YOU HAVE SIX OR MORE DRINKS ON ONE OCCASION: 1

## 2024-07-31 ENCOUNTER — TELEPHONE (OUTPATIENT)
Dept: PHARMACY | Facility: CLINIC | Age: 59
End: 2024-07-31

## 2024-07-31 NOTE — TELEPHONE ENCOUNTER
Values used to calculate the score:      Age: 59 years      Sex: Female      Is Non- : Yes      Diabetic: Yes      Tobacco smoker: No      Systolic Blood Pressure: 118 mmHg      Is BP treated: Yes      HDL Cholesterol: 37 mg/dL      Total Cholesterol: 174 mg/dL     PLAN  Per insurer report, LIS-2 - may be able to receive up to a 100-day supply for the same cost as a 30-day supply.      The following are interventions that have been identified:   Patient OVERDUE refilling LOSARTAN/HCT -25mg  and Ozempic and active on home medication list.     Attempting to reach patient to review.  Left message asking for return call. [employer] message sent to patient.      Last Visit: 2/13/24  Next Visit: 8/2/24    Valentine Mcneill CPhT.   Population Health Clinical   [facility]  Toll free: [phone] Option 1     For Pharmacy Admin Tracking Only    Program: [employer]  CPA in place:  No  Recommendation Provided To: Pharmacy: 1  Intervention Detail: Refill(s) Provided  Intervention Accepted By: Pharmacy: 1  Gap Closed?: No   Time Spent (min): 15

## 2024-08-02 ENCOUNTER — HOSPITAL ENCOUNTER (OUTPATIENT)
Age: 59
Setting detail: SPECIMEN
Discharge: HOME | End: 2024-08-02

## 2024-08-02 ENCOUNTER — OFFICE VISIT (OUTPATIENT)
Dept: INTERNAL MEDICINE | Age: 59
End: 2024-08-02
Payer: MEDICARE

## 2024-08-02 VITALS
SYSTOLIC BLOOD PRESSURE: 136 MMHG | DIASTOLIC BLOOD PRESSURE: 64 MMHG | WEIGHT: 245.2 LBS | HEART RATE: 64 BPM | BODY MASS INDEX: 43.45 KG/M2 | HEIGHT: 63 IN

## 2024-08-02 DIAGNOSIS — Z79.4 TYPE 2 DIABETES MELLITUS WITHOUT COMPLICATION, WITH LONG-TERM CURRENT USE OF INSULIN (HCC): ICD-10-CM

## 2024-08-02 DIAGNOSIS — I10 PRIMARY HYPERTENSION: ICD-10-CM

## 2024-08-02 DIAGNOSIS — E11.9 TYPE 2 DIABETES MELLITUS WITHOUT COMPLICATION, WITH LONG-TERM CURRENT USE OF INSULIN (HCC): ICD-10-CM

## 2024-08-02 DIAGNOSIS — Z00.00 MEDICARE ANNUAL WELLNESS VISIT, SUBSEQUENT: Primary | ICD-10-CM

## 2024-08-02 LAB
ALBUMIN SERPL-MCNC: 4.2 G/DL (ref 3.5–5.2)
ALBUMIN/GLOB SERPL: 1 {RATIO} (ref 1–2.5)
ALP SERPL-CCNC: 113 U/L (ref 35–104)
ALT SERPL-CCNC: 22 U/L (ref 10–35)
ANION GAP SERPL CALCULATED.3IONS-SCNC: 10 MMOL/L (ref 9–16)
AST SERPL-CCNC: 26 U/L (ref 10–35)
BILIRUB SERPL-MCNC: 0.9 MG/DL (ref 0–1.2)
BUN SERPL-MCNC: 12 MG/DL (ref 8–23)
CALCIUM SERPL-MCNC: 10.3 MG/DL (ref 8.6–10.4)
CHLORIDE SERPL-SCNC: 101 MMOL/L (ref 98–107)
CHOLEST SERPL-MCNC: 110 MG/DL (ref 0–199)
CHOLESTEROL/HDL RATIO: 3
CO2 SERPL-SCNC: 28 MMOL/L (ref 20–31)
CREAT SERPL-MCNC: 0.9 MG/DL (ref 0.5–0.9)
CREAT UR-MCNC: 96.1 MG/DL (ref 28–217)
GFR, ESTIMATED: 70 ML/MIN/1.73M2
GLUCOSE SERPL-MCNC: 89 MG/DL (ref 74–99)
HDLC SERPL-MCNC: 35 MG/DL
LDLC SERPL CALC-MCNC: 54 MG/DL (ref 0–100)
POTASSIUM SERPL-SCNC: 4.3 MMOL/L (ref 3.7–5.3)
PROT SERPL-MCNC: 8.3 G/DL (ref 6.6–8.7)
SODIUM SERPL-SCNC: 139 MMOL/L (ref 136–145)
TOTAL PROTEIN, URINE: 9 MG/DL
TRIGL SERPL-MCNC: 107 MG/DL
URINE TOTAL PROTEIN CREATININE RATIO: 0.1
VLDLC SERPL CALC-MCNC: 21 MG/DL

## 2024-08-02 PROCEDURE — 99212 OFFICE O/P EST SF 10 MIN: CPT | Performed by: INTERNAL MEDICINE

## 2024-08-02 RX ORDER — DULAGLUTIDE 0.75 MG/.5ML
0.75 INJECTION, SOLUTION SUBCUTANEOUS WEEKLY
Qty: 2 ML | Refills: 1 | Status: SHIPPED | OUTPATIENT
Start: 2024-08-02

## 2024-08-02 SDOH — ECONOMIC STABILITY: FOOD INSECURITY: WITHIN THE PAST 12 MONTHS, YOU WORRIED THAT YOUR FOOD WOULD RUN OUT BEFORE YOU GOT MONEY TO BUY MORE.: NEVER TRUE

## 2024-08-02 SDOH — ECONOMIC STABILITY: INCOME INSECURITY: HOW HARD IS IT FOR YOU TO PAY FOR THE VERY BASICS LIKE FOOD, HOUSING, MEDICAL CARE, AND HEATING?: NOT HARD AT ALL

## 2024-08-02 SDOH — ECONOMIC STABILITY: FOOD INSECURITY: WITHIN THE PAST 12 MONTHS, THE FOOD YOU BOUGHT JUST DIDN'T LAST AND YOU DIDN'T HAVE MONEY TO GET MORE.: NEVER TRUE

## 2024-08-02 ASSESSMENT — ENCOUNTER SYMPTOMS
WHEEZING: 0
COUGH: 0
SHORTNESS OF BREATH: 0
PHOTOPHOBIA: 0

## 2024-08-02 NOTE — PROGRESS NOTES
Medicare Annual Wellness Visit    Roxie Wallis is here for Medicare AWV (Last AWV 11.17.23), Diabetes (Pt reports no issues, random blood sugar checks, educated to take fasting blood sugars), and Hypertension (Pt states she is compliant with medication, no sx elevated BP - insurance nurse wants pt to be referred to a cardiologist since she is on carvedilol)    Assessment & Plan   Medicare annual wellness visit, subsequent  Recommendations for Preventive Services Due: see orders and patient instructions/AVS.  Recommended screening schedule for the next 5-10 years is provided to the patient in written form: see Patient Instructions/AVS.     No follow-ups on file.     Subjective       Patient's complete Health Risk Assessment and screening values have been reviewed and are found in Flowsheets. The following problems were reviewed today and where indicated follow up appointments were made and/or referrals ordered.    Positive Risk Factor Screenings with Interventions:                Inactivity:  On average, how many days per week do you engage in moderate to strenuous exercise (like a brisk walk)?: 0 days (!) Abnormal  On average, how many minutes do you engage in exercise at this level?: 0 min  Interventions:  Referring to [facility]    Poor Eating Habits/Diet:  Do you eat balanced/healthy meals regularly?: (!) No (discussed with pt, is interested in referral to [facility])  Interventions:  [facility] referral    Abnormal BMI (obese):  Body mass index is 43.44 kg/m². (!) Abnormal  Interventions:  PCP will discuss restarting GLP1             Advanced Directives:  Do you have a Living Will?: (!) No (education provided, referral pended)    Intervention:  has NO advanced directive  - referred to ACP Coordinator                          Objective   Vitals:    08/02/24 0848   BP: 136/64   Site: Right Upper Arm   Position: Sitting   Cuff Size: Large Adult   Pulse: 64   Weight: 111.2 kg (245 lb 3.2 oz)   Height:

## 2024-08-02 NOTE — PROGRESS NOTES
[facility]    Progress Note    Date of patient's visit: 8/2/2024    Patient's Name:  Roxie Wallis    YOB: 1965            Patient Care Team:  Mi Gaffney MD as PCP - General (Internal Medicine)  Mi Gaffney MD as PCP - Empaneled Provider    REASON FOR VISIT: Routine outpatient follow     Chief Complaint   Patient presents with    Medicare AWV     Last AWV 11.17.23    Diabetes     Pt reports no issues, random blood sugar checks, educated to take fasting blood sugars    Hypertension     Pt states she is compliant with medication, no sx elevated BP - insurance nurse wants pt to be referred to a cardiologist since she is on carvedilol         HISTORY OF PRESENT ILLNESS:    History was obtained from the patient. Roxie Wallis is a 59 y.o. is here for routine follow-up.  She did not bring her medications with her.  She stopped taking Ozempic as it was for making her feel more hungry and she was getting hunger pangs.  Trulicity was hard to get and it was stopped.  She cannot tolerate metformin.  She was started on oral empagliflozin/ linagliptin combination due to insurance restrictions.  But patient has having problems with gaining weight, increased hunger and increased appetite.  A1c is only 6.5.  At this point will stop empagliflozin as she is also on HCTZ with losartan.  She has no heart failure.  She will get her urine microalbumin done today.     She has no edema.  She says sometimes she is noticing mild ankle edema but she is eating a lot of lunch meat and we did discuss cutting back on processed meat.  Also amlodipine can cause some edema.  I will see her back in 2 months after she gets her labs.    Echo 2023  CONCLUSIONS     Summary  Normal left ventricle size and function with an estimated EF > 55%.  No segmental wall motion abnormalities seen.  Mild left ventricular hypertrophy.  Normal right ventricular size and function.  Left atrium is

## 2024-08-05 ENCOUNTER — CLINICAL DOCUMENTATION (OUTPATIENT)
Dept: SPIRITUAL SERVICES | Age: 59
End: 2024-08-05

## 2024-08-05 NOTE — ACP (ADVANCE CARE PLANNING)
Advance Care Planning   Ambulatory ACP Specialist Patient Outreach    Date:  8/5/2024    ACP Specialist:  Joyce Gomez LPN    Outreach call to patient in follow-up to ACP Specialist referral from:Mi Gaffney MD    [x] PCP  [] Provider   [] Ambulatory Care Management [] Other     For:                  [x] Advance Directive Assistance              [] Complete Portable DNR order              [] Complete POST/POLST/MOST              [] Code Status Discussion             [] Discuss Goals of Care             [x] Early ACP Decision-Making              [] Other (Specify)    Date Referral Received:8/2/24    Next Step:   [x] ACP scheduled conversation  [] Outreach again in one week               [x] Email / Mail ACP Info Sheets  [x] Email / Mail Advance Directive   [] Closing referral.  Routing closure to referring provider/staff and to ACP Specialist .    [] Closure letter mailed to patient with invitation to contact ACP Specialist if / when ready.   [] Other (Specify here):       [x] At this time, Healthcare Decision Maker Is:      Primary Decision Maker: Fanny Piña - Child - [phone]       [] Primary agent named in scanned advance directive.    [] Legal Next of Kin.     [] Unable to determine legal decision maker at this time.    Outreaches:       [x] 1st -  Date:  8/5/24               Intervention:  [x] Spoke with Patient   [] Left Voice mail [x] Email / Mail    [] [employer]  [] Other (Specify) :     Outcomes:Writer contacted patient on home/mobile phone to discuss ACP.  Patient is agreeable to a conversation with ACP Specialist Kirstie San on Thursday, August 8, 2024 at 9:00 a.m.  A copy of [place] [employer] forms and ACP information sheets sent to patient's confirmed email address on file with ACP Specialist and Coordinator's contact information included.         Thank you for this referral.

## 2024-08-08 ENCOUNTER — CLINICAL DOCUMENTATION (OUTPATIENT)
Dept: SPIRITUAL SERVICES | Age: 59
End: 2024-08-08

## 2024-08-08 NOTE — ACP (ADVANCE CARE PLANNING)
made to restart your heart (answer \"yes\" for attempt to resuscitate) or would you prefer a natural death (answer \"no\" for do not attempt to resuscitate)?\"  Pt states that she is unsure of her answer at this time.        [x] Yes   [] No   Educated Patient / Decision Maker regarding differences between Advance Directives and portable DNR orders.    Length of ACP Conversation in minutes:  45 mins     Conversation Outcomes:  ACP discussion completed and New Advance Directive completed    Follow-up plan:    [] Schedule follow-up conversation to continue planning  [] Referred individual to Provider for additional questions/concerns   [x] Advised patient/agent/surrogate to review completed ACP document and update if needed with changes in condition, patient preferences or care setting    [x] This note routed to one or more involved healthcare providers    ACP conversation has been complete. Pt was able to make her wants and her needs known, along with nominating her HCDM's. Pt has selected her daughter Fanny as her primary agent and does not wish to add any additional agents at this time. Pt's AD documents were completed via [employer] during this conversation. These documents have since been completed, received and uploaded into pt's medical chart. Pt is also asking that these documents be mailed to her confirmed home address, as she is unable to print these independently. This worker has e-mailed ELAINA Pemberton re: this mailing request. Referral can be closed.

## 2024-09-10 DIAGNOSIS — K21.9 GASTROESOPHAGEAL REFLUX DISEASE, UNSPECIFIED WHETHER ESOPHAGITIS PRESENT: ICD-10-CM

## 2024-09-28 DIAGNOSIS — I10 PRIMARY HYPERTENSION: ICD-10-CM

## 2024-09-30 DIAGNOSIS — E58 CALCIUM DEFICIENCY: ICD-10-CM

## 2024-09-30 DIAGNOSIS — E11.9 TYPE 2 DIABETES MELLITUS WITHOUT COMPLICATION, WITH LONG-TERM CURRENT USE OF INSULIN (HCC): ICD-10-CM

## 2024-09-30 DIAGNOSIS — Z79.4 TYPE 2 DIABETES MELLITUS WITHOUT COMPLICATION, WITH LONG-TERM CURRENT USE OF INSULIN (HCC): ICD-10-CM

## 2024-09-30 DIAGNOSIS — E55.9 VITAMIN D DEFICIENCY: ICD-10-CM

## 2024-09-30 RX ORDER — AMLODIPINE BESYLATE 10 MG/1
10 TABLET ORAL DAILY
Qty: 90 TABLET | Refills: 1 | Status: SHIPPED | OUTPATIENT
Start: 2024-09-30

## 2024-09-30 NOTE — TELEPHONE ENCOUNTER
Request for   Requested Prescriptions     Pending Prescriptions Disp Refills    dulaglutide (TRULICITY) 0.75 MG/0.5ML SOPN SC injection 2 mL 5     Sig: Inject 0.5 mLs into the skin once a week    calcium carb-cholecalciferol 600-5 MG-MCG TABS tablet 90 tablet 1     Sig: Take 1 tablet by mouth daily    .      Please review and e-scribe to pharmacy listed in chart if appropriate. Thank you.      Last Visit Date: 8/2/2024  Next Visit Date: Visit date not found    No future appointments.    Health Maintenance   Topic Date Due    Diabetic foot exam  Never done    Diabetic retinal exam  Never done    Respiratory Syncytial Virus (RSV) Pregnant or age 60 yrs+ (1 - 1-dose 60+ series) Never done    Breast cancer screen  03/14/2024    Diabetic Alb to Cr ratio (uACR) test  05/02/2024    Flu vaccine (1) Never done    COVID-19 Vaccine (4 - 2023-24 season) 09/01/2024    DTaP/Tdap/Td vaccine (1 - Tdap) 02/13/2025 (Originally 3/6/1984)    Shingles vaccine (1 of 2) 02/13/2025 (Originally 3/6/2013)    Pneumococcal 0-64 years Vaccine (1 of 2 - PCV) 02/13/2025 (Originally [birth year]/6/1969)    A1C test (Diabetic or Prediabetic)  02/13/2025    Lipids  08/02/2025    GFR test (Diabetes, CKD 3-4, OR last GFR 15-59)  08/02/2025    Annual Wellness Visit (Medicare)  08/03/2025    Depression Screen  08/13/2025    Colorectal Cancer Screen  10/27/2026    Cervical cancer screen  06/01/2028    Hepatitis C screen  Completed    HIV screen  Completed    Hepatitis A vaccine  Aged Out    Hepatitis B vaccine  Aged Out    Hib vaccine  Aged Out    Polio vaccine  Aged Out    Meningococcal (ACWY) vaccine  Aged Out       Hemoglobin A1C (%)   Date Value   02/13/2024 6.5   05/02/2023 5.7   09/21/2022 5.9             ( goal A1C is < 7)   No components found for: \"LABMICR\"  No components found for: \"LDLCHOLESTEROL\", \"LDLCALC\"    (goal LDL is <100)   AST (U/L)   Date Value   08/02/2024 26     ALT (U/L)   Date Value   08/02/2024 22     BUN (mg/dL)   Date Value   08/02/2024

## 2024-09-30 NOTE — TELEPHONE ENCOUNTER
Roxie Wallis is calling to request a refill on the following medication(s):    Medication Request:  Requested Prescriptions     Pending Prescriptions Disp Refills    amLODIPine (NORVASC) 10 MG tablet [Pharmacy Med Name: amLODIPine BESYLATE 10 MG TAB] 90 tablet 1     Sig: TAKE 1 TABLET BY MOUTH DAILY       Last Visit Date (If Applicable):  8/2/2024    Next Visit Date:    Visit date not found

## 2024-10-02 RX ORDER — ASPIRIN 81 MG
1 TABLET, DELAYED RELEASE (ENTERIC COATED) ORAL DAILY
Qty: 90 TABLET | Refills: 1 | Status: SHIPPED | OUTPATIENT
Start: 2024-10-02

## 2024-10-02 RX ORDER — DULAGLUTIDE 0.75 MG/.5ML
0.75 INJECTION, SOLUTION SUBCUTANEOUS WEEKLY
Qty: 2 ML | Refills: 1 | Status: SHIPPED | OUTPATIENT
Start: 2024-10-02

## 2024-10-28 DIAGNOSIS — E11.9 TYPE 2 DIABETES MELLITUS WITHOUT COMPLICATION, WITH LONG-TERM CURRENT USE OF INSULIN (HCC): Primary | ICD-10-CM

## 2024-10-28 DIAGNOSIS — Z79.4 TYPE 2 DIABETES MELLITUS WITHOUT COMPLICATION, WITH LONG-TERM CURRENT USE OF INSULIN (HCC): Primary | ICD-10-CM

## 2024-10-28 NOTE — TELEPHONE ENCOUNTER
Received PC from pt requesting increase in her Trulicity dose. Pt states she doesn't think it is working as well as it was before, states she has had increase in food cravings that were initially reduced with taking Trulicity. Pt states fasting blood sugars are 130s-140s most mornings, occasionally higher.    Script pended, pt states she just requested refill of the 0.75 mg dose, so will start increased dose in 4 weeks.

## 2024-10-29 RX ORDER — DULAGLUTIDE 1.5 MG/.5ML
INJECTION, SOLUTION SUBCUTANEOUS
Qty: 2 ML | Refills: 3 | Status: SHIPPED | OUTPATIENT
Start: 2024-10-29

## 2024-10-31 ENCOUNTER — TELEPHONE (OUTPATIENT)
Dept: PHARMACY | Facility: CLINIC | Age: 59
End: 2024-10-31

## 2024-10-31 NOTE — TELEPHONE ENCOUNTER
review if refill/s remain (losartan-HCTZ last reordered on same date and did have refill available)    Lab Results   Component Value Date    CHOL 110 2024    TRIG 107 2024    HDL 35 (L) 2024     Lab Results   Component Value Date    LDL 54 2024      ALT   Date Value Ref Range Status   2024 22 10 - 35 U/L Final     AST   Date Value Ref Range Status   2024 26 10 - 35 U/L Final     The ASCVD Risk score (Yfn DK, et al., 2019) failed to calculate for the following reasons:    The valid total cholesterol range is 130 to 320 mg/dL     PLAN  Per insurer report, LIS-2 - may be able to receive up to a 100-day supply for the same cost as a 30-day supply.    The following are interventions that have been identified:   Patient OVERDUE refilling losartan-HCTZ 100-25mg daily YTD (due to refill again @now) and active on home medication list.   Refill/s of losartan-HCTZ will be READY to  at patient's [facility] pharmacy after 2p tomorrow  Patient eligible for 84 day supply of Trulicity (future consideration - will be increasing dose soon)    Reached patient to review. Confirms she's taking losartan-HCTZ daily - reports she may miss an occasional dose, but not as often as refill gap/s and still has 72 tablets on hand and confirms bottle is from July. States she uses a weekly pill organizer to take her medication each day.    Last Visit: 24  Next Visit: to be scheduled     Krissy Goode PharmD, BCACP  Population Health Pharmacist  [facility]  Department, toll free: [phone], option 1     =======================================================   For Pharmacy Admin Tracking Only    Program: Pop Health  CPA in place:  No  Recommendation Provided To: Patient/Caregiver: 1 via Telephone and Pharmacy: 1  Intervention Detail: Adherence Monitorin  Intervention Accepted By: Provider: 1 and Patient/Caregiver: 1  Gap Closed?: No   Time Spent (min): 15

## 2024-12-10 DIAGNOSIS — E78.00 PURE HYPERCHOLESTEROLEMIA: ICD-10-CM

## 2024-12-10 DIAGNOSIS — E11.9 TYPE 2 DIABETES MELLITUS WITHOUT COMPLICATION, WITH LONG-TERM CURRENT USE OF INSULIN (HCC): ICD-10-CM

## 2024-12-10 DIAGNOSIS — Z79.4 TYPE 2 DIABETES MELLITUS WITHOUT COMPLICATION, WITH LONG-TERM CURRENT USE OF INSULIN (HCC): ICD-10-CM

## 2024-12-10 DIAGNOSIS — I10 PRIMARY HYPERTENSION: ICD-10-CM

## 2024-12-11 RX ORDER — ATORVASTATIN CALCIUM 40 MG/1
40 TABLET, FILM COATED ORAL DAILY
Qty: 90 TABLET | Refills: 0 | Status: SHIPPED | OUTPATIENT
Start: 2024-12-11 | End: 2025-01-27 | Stop reason: SDUPTHER

## 2024-12-11 RX ORDER — CARVEDILOL 6.25 MG/1
6.25 TABLET ORAL 2 TIMES DAILY
Qty: 180 TABLET | Refills: 0 | Status: SHIPPED | OUTPATIENT
Start: 2024-12-11 | End: 2025-01-16

## 2024-12-11 NOTE — TELEPHONE ENCOUNTER
Roxie Wallis is calling to request a refill on the following medication(s):    Medication Request:  Requested Prescriptions     Pending Prescriptions Disp Refills    carvedilol (COREG) 6.25 MG tablet [Pharmacy Med Name: CARVEDILOL 6.25 MG TABLET] 180 tablet 1     Sig: TAKE 1 TABLET BY MOUTH TWICE A DAY    atorvastatin (LIPITOR) 40 MG tablet [Pharmacy Med Name: ATORVASTATIN 40 MG TABLET] 90 tablet 1     Sig: TAKE 1 TABLET BY MOUTH DAILY       Last Visit Date (If Applicable):  8/2/2024    Next Visit Date:    Visit date not found

## 2025-01-16 DIAGNOSIS — I10 PRIMARY HYPERTENSION: ICD-10-CM

## 2025-01-16 RX ORDER — AMLODIPINE BESYLATE 10 MG/1
10 TABLET ORAL DAILY
Qty: 90 TABLET | Refills: 0 | Status: SHIPPED | OUTPATIENT
Start: 2025-01-16

## 2025-01-16 RX ORDER — CARVEDILOL 6.25 MG/1
6.25 TABLET ORAL 2 TIMES DAILY
Qty: 180 TABLET | Refills: 0 | Status: SHIPPED | OUTPATIENT
Start: 2025-01-16

## 2025-01-16 RX ORDER — LOSARTAN POTASSIUM AND HYDROCHLOROTHIAZIDE 25; 100 MG/1; MG/1
1 TABLET ORAL DAILY
Qty: 90 TABLET | Refills: 0 | Status: SHIPPED | OUTPATIENT
Start: 2025-01-16

## 2025-01-16 NOTE — TELEPHONE ENCOUNTER
Roxie Wallis is calling to request a refill on the following medication(s):    Medication Request:  Requested Prescriptions     Pending Prescriptions Disp Refills    losartan-hydroCHLOROthiazide (HYZAAR) 100-25 MG per tablet [Pharmacy Med Name: LOSARTAN-HCTZ 100-25 MG TAB] 90 tablet 1     Sig: TAKE 1 TABLET BY MOUTH DAILY    amLODIPine (NORVASC) 10 MG tablet [Pharmacy Med Name: amLODIPine BESYLATE 10MG TAB] 90 tablet 1     Sig: TAKE 1 TABLET BY MOUTH DAILY       Last Visit Date (If Applicable):  8/2/2024    Next Visit Date:    Visit date not found

## 2025-01-16 NOTE — TELEPHONE ENCOUNTER
Roxie Wallis is calling to request a refill on the following medication(s):    Medication Request:  Requested Prescriptions     Pending Prescriptions Disp Refills    carvedilol (COREG) 6.25 MG tablet [Pharmacy Med Name: CARVEDILOL 6.25 MG TABLET] 180 tablet 0     Sig: TAKE 1 TABLET BY MOUTH TWICE A DAY       Last Visit Date (If Applicable):  8/2/2024    Next Visit Date:    Visit date not found

## 2025-01-24 DIAGNOSIS — Z79.4 TYPE 2 DIABETES MELLITUS WITHOUT COMPLICATION, WITH LONG-TERM CURRENT USE OF INSULIN (HCC): ICD-10-CM

## 2025-01-24 DIAGNOSIS — K21.9 GASTROESOPHAGEAL REFLUX DISEASE, UNSPECIFIED WHETHER ESOPHAGITIS PRESENT: ICD-10-CM

## 2025-01-24 DIAGNOSIS — E11.9 TYPE 2 DIABETES MELLITUS WITHOUT COMPLICATION, WITH LONG-TERM CURRENT USE OF INSULIN (HCC): ICD-10-CM

## 2025-01-24 NOTE — TELEPHONE ENCOUNTER
Request for   Requested Prescriptions     Pending Prescriptions Disp Refills    omeprazole (PRILOSEC) 20 MG delayed release capsule 90 capsule 1     Sig: Take 1 capsule by mouth every morning (before breakfast)    .      Please review and e-scribe to pharmacy listed in chart if appropriate. Thank you.      Last Visit Date: 8/2/2024  Next Visit Date: 1/27/2025    Future Appointments   Date Time Provider Department Center   1/27/2025 10:00 AM SCHEDULE, P [facility] NURSE [facility] DEP       Health Maintenance   Topic Date Due    Diabetic retinal exam  Never done    Respiratory Syncytial Virus (RSV) Pregnant or age 60 yrs+ (1 - Risk 60-74 years 1-dose series) Never done    Breast cancer screen  03/14/2024    Diabetic Alb to Cr ratio (uACR) test  05/02/2024    Flu vaccine (1) Never done    COVID-19 Vaccine (4 - 2023-24 season) 09/01/2024    A1C test (Diabetic or Prediabetic)  02/13/2025    DTaP/Tdap/Td vaccine (1 - Tdap) 02/13/2025 (Originally 3/6/1982)    Shingles vaccine (1 of 2) 02/13/2025 (Originally 3/6/2013)    Pneumococcal 0-64 years Vaccine (1 of 2 - PCV) 02/13/2025 (Originally [birth year]/6/1969)    Lipids  08/02/2025    GFR test (Diabetes, CKD 3-4, OR last GFR 15-59)  08/02/2025    Annual Wellness Visit (Medicare)  08/03/2025    Depression Screen  08/13/2025    Diabetic foot exam  01/02/2026    Colorectal Cancer Screen  10/27/2026    Cervical cancer screen  06/01/2028    Hepatitis C screen  Completed    HIV screen  Completed    Hepatitis A vaccine  Aged Out    Hepatitis B vaccine  Aged Out    Hib vaccine  Aged Out    Polio vaccine  Aged Out    Meningococcal (ACWY) vaccine  Aged Out       Hemoglobin A1C (%)   Date Value   02/13/2024 6.5   05/02/2023 5.7   09/21/2022 5.9             ( goal A1C is < 7)   No components found for: \"LABMICR\"  No components found for: \"LDLCHOLESTEROL\", \"LDLCALC\"    (goal LDL is <100)   AST (U/L)   Date Value   08/02/2024 26     ALT (U/L)   Date Value   08/02/2024 22     BUN (mg/dL)   Date

## 2025-01-27 DIAGNOSIS — E55.9 VITAMIN D DEFICIENCY: ICD-10-CM

## 2025-01-27 DIAGNOSIS — E78.00 PURE HYPERCHOLESTEROLEMIA: ICD-10-CM

## 2025-01-27 DIAGNOSIS — E11.9 TYPE 2 DIABETES MELLITUS WITHOUT COMPLICATION, WITH LONG-TERM CURRENT USE OF INSULIN (HCC): ICD-10-CM

## 2025-01-27 DIAGNOSIS — E58 CALCIUM DEFICIENCY: ICD-10-CM

## 2025-01-27 DIAGNOSIS — Z79.4 TYPE 2 DIABETES MELLITUS WITHOUT COMPLICATION, WITH LONG-TERM CURRENT USE OF INSULIN (HCC): ICD-10-CM

## 2025-01-27 NOTE — TELEPHONE ENCOUNTER
Request for   Requested Prescriptions     Pending Prescriptions Disp Refills    calcium carb-cholecalciferol 600-5 MG-MCG TABS tablet 90 tablet 1     Sig: Take 1 tablet by mouth daily    .      Please review and e-scribe to pharmacy listed in chart if appropriate. Thank you.      Last Visit Date: 8/2/2024  Next Visit Date: 1/28/2025    Future Appointments   Date Time Provider Department Center   1/28/2025  1:40 PM Monique Mcdonough MD [facility] DEP       Health Maintenance   Topic Date Due    Diabetic retinal exam  Never done    Respiratory Syncytial Virus (RSV) Pregnant or age 60 yrs+ (1 - Risk 60-74 years 1-dose series) Never done    Breast cancer screen  03/14/2024    Diabetic Alb to Cr ratio (uACR) test  05/02/2024    Flu vaccine (1) Never done    COVID-19 Vaccine (4 - 2023-24 season) 09/01/2024    A1C test (Diabetic or Prediabetic)  02/13/2025    DTaP/Tdap/Td vaccine (1 - Tdap) 02/13/2025 (Originally 3/6/1982)    Shingles vaccine (1 of 2) 02/13/2025 (Originally 3/6/2013)    Pneumococcal 0-64 years Vaccine (1 of 2 - PCV) 02/13/2025 (Originally [birth year]/6/1969)    Lipids  08/02/2025    GFR test (Diabetes, CKD 3-4, OR last GFR 15-59)  08/02/2025    Annual Wellness Visit (Medicare)  08/03/2025    Depression Screen  08/13/2025    Diabetic foot exam  01/02/2026    Colorectal Cancer Screen  10/27/2026    Cervical cancer screen  06/01/2028    Hepatitis C screen  Completed    HIV screen  Completed    Hepatitis A vaccine  Aged Out    Hepatitis B vaccine  Aged Out    Hib vaccine  Aged Out    Polio vaccine  Aged Out    Meningococcal (ACWY) vaccine  Aged Out       Hemoglobin A1C (%)   Date Value   02/13/2024 6.5   05/02/2023 5.7   09/21/2022 5.9             ( goal A1C is < 7)   No components found for: \"LABMICR\"  No components found for: \"LDLCHOLESTEROL\", \"LDLCALC\"    (goal LDL is <100)   AST (U/L)   Date Value   08/02/2024 26     ALT (U/L)   Date Value   08/02/2024 22     BUN (mg/dL)   Date Value   08/02/2024 12     BP Readings

## 2025-01-28 ENCOUNTER — OFFICE VISIT (OUTPATIENT)
Dept: INTERNAL MEDICINE | Age: 60
End: 2025-01-28
Payer: MEDICARE

## 2025-01-28 VITALS
DIASTOLIC BLOOD PRESSURE: 77 MMHG | HEIGHT: 63 IN | BODY MASS INDEX: 45.68 KG/M2 | OXYGEN SATURATION: 99 % | TEMPERATURE: 97.3 F | SYSTOLIC BLOOD PRESSURE: 128 MMHG | WEIGHT: 257.8 LBS | HEART RATE: 94 BPM

## 2025-01-28 DIAGNOSIS — I10 PRIMARY HYPERTENSION: ICD-10-CM

## 2025-01-28 DIAGNOSIS — Z79.4 TYPE 2 DIABETES MELLITUS WITHOUT COMPLICATION, WITH LONG-TERM CURRENT USE OF INSULIN (HCC): Primary | ICD-10-CM

## 2025-01-28 DIAGNOSIS — E66.01 CLASS 3 SEVERE OBESITY DUE TO EXCESS CALORIES WITH SERIOUS COMORBIDITY AND BODY MASS INDEX (BMI) OF 45.0 TO 49.9 IN ADULT: ICD-10-CM

## 2025-01-28 DIAGNOSIS — Z12.31 ENCOUNTER FOR SCREENING MAMMOGRAM FOR MALIGNANT NEOPLASM OF BREAST: ICD-10-CM

## 2025-01-28 DIAGNOSIS — E11.9 TYPE 2 DIABETES MELLITUS WITHOUT COMPLICATION, WITH LONG-TERM CURRENT USE OF INSULIN (HCC): Primary | ICD-10-CM

## 2025-01-28 DIAGNOSIS — E66.813 CLASS 3 SEVERE OBESITY DUE TO EXCESS CALORIES WITH SERIOUS COMORBIDITY AND BODY MASS INDEX (BMI) OF 45.0 TO 49.9 IN ADULT: ICD-10-CM

## 2025-01-28 LAB — HBA1C MFR BLD: 6.3 %

## 2025-01-28 PROCEDURE — 3044F HG A1C LEVEL LT 7.0%: CPT | Performed by: STUDENT IN AN ORGANIZED HEALTH CARE EDUCATION/TRAINING PROGRAM

## 2025-01-28 PROCEDURE — 83036 HEMOGLOBIN GLYCOSYLATED A1C: CPT | Performed by: STUDENT IN AN ORGANIZED HEALTH CARE EDUCATION/TRAINING PROGRAM

## 2025-01-28 PROCEDURE — 99213 OFFICE O/P EST LOW 20 MIN: CPT | Performed by: STUDENT IN AN ORGANIZED HEALTH CARE EDUCATION/TRAINING PROGRAM

## 2025-01-28 PROCEDURE — 3078F DIAST BP <80 MM HG: CPT | Performed by: STUDENT IN AN ORGANIZED HEALTH CARE EDUCATION/TRAINING PROGRAM

## 2025-01-28 PROCEDURE — 3074F SYST BP LT 130 MM HG: CPT | Performed by: STUDENT IN AN ORGANIZED HEALTH CARE EDUCATION/TRAINING PROGRAM

## 2025-01-28 RX ORDER — ATORVASTATIN CALCIUM 40 MG/1
40 TABLET, FILM COATED ORAL DAILY
Qty: 90 TABLET | Refills: 1 | Status: SHIPPED | OUTPATIENT
Start: 2025-01-28

## 2025-01-28 RX ORDER — ASPIRIN 81 MG
1 TABLET, DELAYED RELEASE (ENTERIC COATED) ORAL DAILY
Qty: 90 TABLET | Refills: 1 | Status: SHIPPED | OUTPATIENT
Start: 2025-01-28

## 2025-01-28 SDOH — ECONOMIC STABILITY: INCOME INSECURITY: IN THE LAST 12 MONTHS, WAS THERE A TIME WHEN YOU WERE NOT ABLE TO PAY THE MORTGAGE OR RENT ON TIME?: NO

## 2025-01-28 SDOH — ECONOMIC STABILITY: FOOD INSECURITY: WITHIN THE PAST 12 MONTHS, YOU WORRIED THAT YOUR FOOD WOULD RUN OUT BEFORE YOU GOT MONEY TO BUY MORE.: NEVER TRUE

## 2025-01-28 SDOH — ECONOMIC STABILITY: FOOD INSECURITY: WITHIN THE PAST 12 MONTHS, THE FOOD YOU BOUGHT JUST DIDN'T LAST AND YOU DIDN'T HAVE MONEY TO GET MORE.: NEVER TRUE

## 2025-01-28 ASSESSMENT — PATIENT HEALTH QUESTIONNAIRE - PHQ9
SUM OF ALL RESPONSES TO PHQ QUESTIONS 1-9: 0
1. LITTLE INTEREST OR PLEASURE IN DOING THINGS: NOT AT ALL
SUM OF ALL RESPONSES TO PHQ9 QUESTIONS 1 & 2: 0
2. FEELING DOWN, DEPRESSED OR HOPELESS: NOT AT ALL
SUM OF ALL RESPONSES TO PHQ9 QUESTIONS 1 & 2: 0
2. FEELING DOWN, DEPRESSED OR HOPELESS: NOT AT ALL
SUM OF ALL RESPONSES TO PHQ QUESTIONS 1-9: 0
SUM OF ALL RESPONSES TO PHQ QUESTIONS 1-9: 0
SUM OF ALL RESPONSES TO PHQ9 QUESTIONS 1 & 2: 0
SUM OF ALL RESPONSES TO PHQ QUESTIONS 1-9: 0
SUM OF ALL RESPONSES TO PHQ QUESTIONS 1-9: 0
1. LITTLE INTEREST OR PLEASURE IN DOING THINGS: NOT AT ALL
SUM OF ALL RESPONSES TO PHQ QUESTIONS 1-9: 0
1. LITTLE INTEREST OR PLEASURE IN DOING THINGS: NOT AT ALL
SUM OF ALL RESPONSES TO PHQ QUESTIONS 1-9: 0
SUM OF ALL RESPONSES TO PHQ QUESTIONS 1-9: 0
2. FEELING DOWN, DEPRESSED OR HOPELESS: NOT AT ALL

## 2025-01-28 ASSESSMENT — ENCOUNTER SYMPTOMS
ABDOMINAL PAIN: 0
SHORTNESS OF BREATH: 0
DIARRHEA: 0
NAUSEA: 0
WHEEZING: 0
CHEST TIGHTNESS: 0
COUGH: 0
VOMITING: 0
CONSTIPATION: 0

## 2025-01-28 NOTE — PROGRESS NOTES
Chief Complaint   Patient presents with    Follow-up     Patient present for follow up for Diabetes and to discuss Medications.      HPI:  Roxie Wallis is a.59 y.o. female coming into clinic regarding to discuss meds for weight loss and diabetes    #Type 2 DM  Patient was on Trulicity in the past  Then switched to Ozempic for weight loss  She stopped taking Ozempic as it was for making her feel more hungry and she was getting hunger pangs.  cannot tolerate metformin   oral empagliflozin/ linagliptin stopped because of weight gained  Was started on Trulicity again but per patient she is not losing weight instead  she gained weight  Interested in weight loss  HbA1c 6.3 today    #HTN : controlled  C/w losartan-HCTz and amlodipine and coreg     History:  Past Medical History:   Diagnosis Date    Diabetes mellitus (HCC)     Hyperlipidemia     Hypertension     Murmur     echo done 23    Obesity     MACHELLE on CPAP     Wellness examination     Dr Gaffney; last visit 23     Past Surgical History:   Procedure Laterality Date    CERVIX BIOPSY N/A 2023    COLD KNIFE CONE performed by Holley Russo DO at [facility] OR     SECTION      CHOLECYSTECTOMY      OTHER SURGICAL HISTORY  2023    cold knife bx    TUBAL LIGATION Bilateral     at time of csection     Family History   Problem Relation Age of Onset    Cancer Mother         lung    Diabetes Mother     Breast Cancer Sister 54      Social History     Socioeconomic History    Marital status: Single   Tobacco Use    Smoking status: Never    Smokeless tobacco: Never   Vaping Use    Vaping status: Never Used   Substance and Sexual Activity    Alcohol use: No    Drug use: No    Sexual activity: Not Currently     Partners: Male     Social Determinants of Health     Financial Resource Strain: Low Risk  (2024)    Overall Financial Resource Strain (CARDIA)     Difficulty of Paying Living Expenses: Not hard at all   Transportation Needs: Unknown

## 2025-01-31 ENCOUNTER — TELEPHONE (OUTPATIENT)
Dept: INTERNAL MEDICINE | Age: 60
End: 2025-01-31

## 2025-01-31 NOTE — TELEPHONE ENCOUNTER
Received a call from [employer] stating that this patient has been approved for Ozempic. The technician stated that the Ozempic is due to be shipped out of their facility within 10-14 business days and will be delivered to our office.

## 2025-02-04 NOTE — TELEPHONE ENCOUNTER
Request for   Requested Prescriptions     Pending Prescriptions Disp Refills    nystatin (MYCOSTATIN) 749440 UNIT/GM cream 1 each 5     Sig: Apply topically daily as needed for Dry Skin    .      Please review and e-scribe to pharmacy listed in chart if appropriate. Thank you.      Last Visit Date: 1/28/2025  Next Visit Date: 4/22/2025    Future Appointments   Date Time Provider Department Center   4/22/2025  1:00 PM Monique Mcdonough MD [facility] DEP       Health Maintenance   Topic Date Due    Diabetic retinal exam  Never done    Respiratory Syncytial Virus (RSV) Pregnant or age 60 yrs+ (1 - Risk 60-74 years 1-dose series) Never done    Breast cancer screen  03/14/2024    Diabetic Alb to Cr ratio (uACR) test  05/02/2024    Flu vaccine (1) Never done    COVID-19 Vaccine (4 - 2023-24 season) 09/01/2024    DTaP/Tdap/Td vaccine (1 - Tdap) 02/13/2025 (Originally 3/6/1982)    Shingles vaccine (1 of 2) 02/13/2025 (Originally 3/6/2013)    Pneumococcal 0-64 years Vaccine (1 of 2 - PCV) 02/13/2025 (Originally 3/6/1969)    Lipids  08/02/2025    GFR test (Diabetes, CKD 3-4, OR last GFR 15-59)  08/02/2025    Annual Wellness Visit (Medicare)  08/03/2025    Diabetic foot exam  01/02/2026    A1C test (Diabetic or Prediabetic)  01/28/2026    Depression Screen  01/28/2026    Colorectal Cancer Screen  10/27/2026    Cervical cancer screen  06/01/2028    Hepatitis C screen  Completed    HIV screen  Completed    Hepatitis A vaccine  Aged Out    Hepatitis B vaccine  Aged Out    Hib vaccine  Aged Out    Polio vaccine  Aged Out    Meningococcal (ACWY) vaccine  Aged Out       Hemoglobin A1C (%)   Date Value   01/28/2025 6.3   02/13/2024 6.5   05/02/2023 5.7             ( goal A1C is < 7)   No components found for: \"LABMICR\"  No components found for: \"LDLCHOLESTEROL\", \"LDLCALC\"    (goal LDL is <100)   AST (U/L)   Date Value   08/02/2024 26     ALT (U/L)   Date Value   08/02/2024 22     BUN (mg/dL)   Date Value   08/02/2024 12     BP

## 2025-02-07 RX ORDER — NYSTATIN 100000 U/G
CREAM TOPICAL DAILY PRN
Qty: 1 EACH | Refills: 5 | Status: SHIPPED | OUTPATIENT
Start: 2025-02-07

## 2025-02-20 ENCOUNTER — HOSPITAL ENCOUNTER (OUTPATIENT)
Dept: MAMMOGRAPHY | Age: 60
Discharge: HOME | End: 2025-02-22
Payer: MEDICARE

## 2025-02-20 VITALS — WEIGHT: 257 LBS | HEIGHT: 63 IN | BODY MASS INDEX: 45.54 KG/M2

## 2025-02-20 DIAGNOSIS — Z12.31 ENCOUNTER FOR SCREENING MAMMOGRAM FOR MALIGNANT NEOPLASM OF BREAST: ICD-10-CM

## 2025-02-20 PROCEDURE — 77063 BREAST TOMOSYNTHESIS BI: CPT

## 2025-02-21 ENCOUNTER — TELEPHONE (OUTPATIENT)
Dept: INTERNAL MEDICINE | Age: 60
End: 2025-02-21

## 2025-02-21 NOTE — TELEPHONE ENCOUNTER
Ozempic received from [facility].    PC to pt to notify, pt states she will be in today to pick it up.    1313 Addendum: Pt picked medication up. Discussed 4 weeks of 0.25 mg once weekly, then increase to 0.5 mg once weekly. Pt verbalized understanding, pt has writer's number if there are questions.

## 2025-03-17 ENCOUNTER — TELEPHONE (OUTPATIENT)
Dept: PHARMACY | Facility: CLINIC | Age: 60
End: 2025-03-17

## 2025-03-17 NOTE — TELEPHONE ENCOUNTER
[facility] CLINICAL PHARMACY: ADHERENCE REVIEW  Identified care gap per [employer]: fills at [facility]: Diabetes adherence    ASSESSMENT  DIABETES ADHERENCE    Insurance Records claims through 3/7/25 (Prior Year PDC = not reported; YTD PDC = 54%; Potential Fail Date: 04/24/25):   TRULICITY  INJ 1.5/0.5 last filled on 01/16/25 for 30 day supply. Next refill due: 0213    Prescribed sig:  inject once weekly    Per Insurer Portal: last filled on same      Lab Results   Component Value Date    LABA1C 6.3 01/28/2025    LABA1C 6.5 02/13/2024    LABA1C 5.7 05/02/2023     NOTE: A1c <9%    PLAN  Per insurer report, LIS-1 - may be able to receive up to a 100-day supply for the same cost as a 30-day supply.    The following are interventions that have been identified:   Patient overdue refilling TRULICITY  INJ 1.5/0.5 . Unsure if patient is still prescribed this medication.     No patient outreach planned at this time.  Last Visit: 01/28/25  Next Visit: 04/22/25    Discontinued for Allergic response on 01/28/25    Appears now on ozempic getting thru denice nordisk      Poornima Davidson CPhT  [facility] Clinical   [facility]  Toll Free: [phone] Option 1        For Pharmacy Admin Tracking Only    Program: [facility]  CPA in place:  No  Gap Closed?: Yes   Time Spent (min): 10

## 2025-04-21 SDOH — HEALTH STABILITY: PHYSICAL HEALTH: ON AVERAGE, HOW MANY MINUTES DO YOU ENGAGE IN EXERCISE AT THIS LEVEL?: 0 MIN

## 2025-04-21 SDOH — HEALTH STABILITY: PHYSICAL HEALTH: ON AVERAGE, HOW MANY DAYS PER WEEK DO YOU ENGAGE IN MODERATE TO STRENUOUS EXERCISE (LIKE A BRISK WALK)?: 0 DAYS

## 2025-04-22 ENCOUNTER — OFFICE VISIT (OUTPATIENT)
Dept: INTERNAL MEDICINE | Age: 60
End: 2025-04-22
Payer: MEDICARE

## 2025-04-22 ENCOUNTER — HOSPITAL ENCOUNTER (OUTPATIENT)
Age: 60
Setting detail: SPECIMEN
Discharge: HOME | End: 2025-04-22

## 2025-04-22 VITALS
DIASTOLIC BLOOD PRESSURE: 88 MMHG | WEIGHT: 255 LBS | SYSTOLIC BLOOD PRESSURE: 128 MMHG | BODY MASS INDEX: 45.18 KG/M2 | TEMPERATURE: 97.7 F | OXYGEN SATURATION: 97 % | HEIGHT: 63 IN | HEART RATE: 69 BPM

## 2025-04-22 DIAGNOSIS — I10 PRIMARY HYPERTENSION: ICD-10-CM

## 2025-04-22 DIAGNOSIS — Z98.890 POSTOPERATIVE STATE: ICD-10-CM

## 2025-04-22 DIAGNOSIS — Z79.4 TYPE 2 DIABETES MELLITUS WITHOUT COMPLICATION, WITH LONG-TERM CURRENT USE OF INSULIN (HCC): ICD-10-CM

## 2025-04-22 DIAGNOSIS — R87.810 CERVICAL HIGH RISK HPV (HUMAN PAPILLOMAVIRUS) TEST POSITIVE: ICD-10-CM

## 2025-04-22 DIAGNOSIS — E11.9 TYPE 2 DIABETES MELLITUS WITHOUT COMPLICATION, WITH LONG-TERM CURRENT USE OF INSULIN (HCC): ICD-10-CM

## 2025-04-22 DIAGNOSIS — E11.9 TYPE 2 DIABETES MELLITUS WITHOUT COMPLICATION, WITH LONG-TERM CURRENT USE OF INSULIN (HCC): Primary | ICD-10-CM

## 2025-04-22 DIAGNOSIS — Z79.4 TYPE 2 DIABETES MELLITUS WITHOUT COMPLICATION, WITH LONG-TERM CURRENT USE OF INSULIN (HCC): Primary | ICD-10-CM

## 2025-04-22 PROCEDURE — 99212 OFFICE O/P EST SF 10 MIN: CPT | Performed by: STUDENT IN AN ORGANIZED HEALTH CARE EDUCATION/TRAINING PROGRAM

## 2025-04-22 PROCEDURE — 99213 OFFICE O/P EST LOW 20 MIN: CPT | Performed by: STUDENT IN AN ORGANIZED HEALTH CARE EDUCATION/TRAINING PROGRAM

## 2025-04-22 PROCEDURE — 3074F SYST BP LT 130 MM HG: CPT | Performed by: STUDENT IN AN ORGANIZED HEALTH CARE EDUCATION/TRAINING PROGRAM

## 2025-04-22 PROCEDURE — 3044F HG A1C LEVEL LT 7.0%: CPT | Performed by: STUDENT IN AN ORGANIZED HEALTH CARE EDUCATION/TRAINING PROGRAM

## 2025-04-22 PROCEDURE — 3079F DIAST BP 80-89 MM HG: CPT | Performed by: STUDENT IN AN ORGANIZED HEALTH CARE EDUCATION/TRAINING PROGRAM

## 2025-04-22 ASSESSMENT — ENCOUNTER SYMPTOMS
DIARRHEA: 0
SHORTNESS OF BREATH: 0
COUGH: 0
NAUSEA: 0
VOMITING: 0
ABDOMINAL PAIN: 0
CONSTIPATION: 0
CHEST TIGHTNESS: 0
WHEEZING: 0

## 2025-04-22 ASSESSMENT — PATIENT HEALTH QUESTIONNAIRE - PHQ9
10. IF YOU CHECKED OFF ANY PROBLEMS, HOW DIFFICULT HAVE THESE PROBLEMS MADE IT FOR YOU TO DO YOUR WORK, TAKE CARE OF THINGS AT HOME, OR GET ALONG WITH OTHER PEOPLE: NOT DIFFICULT AT ALL
9. THOUGHTS THAT YOU WOULD BE BETTER OFF DEAD, OR OF HURTING YOURSELF: NOT AT ALL
1. LITTLE INTEREST OR PLEASURE IN DOING THINGS: NOT AT ALL
4. FEELING TIRED OR HAVING LITTLE ENERGY: NOT AT ALL
8. MOVING OR SPEAKING SO SLOWLY THAT OTHER PEOPLE COULD HAVE NOTICED. OR THE OPPOSITE, BEING SO FIGETY OR RESTLESS THAT YOU HAVE BEEN MOVING AROUND A LOT MORE THAN USUAL: NOT AT ALL
2. FEELING DOWN, DEPRESSED OR HOPELESS: NOT AT ALL
SUM OF ALL RESPONSES TO PHQ QUESTIONS 1-9: 0
SUM OF ALL RESPONSES TO PHQ QUESTIONS 1-9: 0
3. TROUBLE FALLING OR STAYING ASLEEP: NOT AT ALL
5. POOR APPETITE OR OVEREATING: NOT AT ALL
6. FEELING BAD ABOUT YOURSELF - OR THAT YOU ARE A FAILURE OR HAVE LET YOURSELF OR YOUR FAMILY DOWN: NOT AT ALL
SUM OF ALL RESPONSES TO PHQ QUESTIONS 1-9: 0
7. TROUBLE CONCENTRATING ON THINGS, SUCH AS READING THE NEWSPAPER OR WATCHING TELEVISION: NOT AT ALL
SUM OF ALL RESPONSES TO PHQ QUESTIONS 1-9: 0

## 2025-04-22 NOTE — PROGRESS NOTES
Chief Complaint   Patient presents with    Established New Doctor     Patient presents today as New to Provider transfer from Dr. Gaffney. Patient is doing well with no complaints.      HPI:  Roxie Wallis is a.60 y.o. female coming into clinic to follow up    #Type 2 DM  Patient was on Trulicity in the past  Then switched to Ozempic for weight loss  cannot tolerate metformin   oral empagliflozin/ linagliptin stopped because of weight gained  Last HbA1c 6.3   Didnt lose any weight yet  She is taking 0.5 mg of Ozempic   Denies ay GI upset  Follows with ophthalmology    #HTN : controlled  C/w losartan-HCTz and amlodipine and coreg     Had mammogram in 2025 negative for any abnormality  Had pap mere in  that was negative for any abnormality on cytology but HPV high risk was positive and she underwent colposcopy that was negative for any dysplasia  Follows with OBG/Yn        History:  Past Medical History:   Diagnosis Date    Diabetes mellitus (HCC)     Hyperlipidemia     Hypertension     Murmur     echo done 23    Obesity     MACHELLE on CPAP     Wellness examination     Dr Gaffney; last visit 23     Past Surgical History:   Procedure Laterality Date    CERVIX BIOPSY N/A 2023    COLD KNIFE CONE performed by Holley Russo DO at [facility] OR     SECTION      CHOLECYSTECTOMY      OTHER SURGICAL HISTORY  2023    cold knife bx    TUBAL LIGATION Bilateral     at time of csection     Family History   Problem Relation Age of Onset    Cancer Mother         lung    Diabetes Mother     Breast Cancer Sister 54      Social History     Socioeconomic History    Marital status: Single   Tobacco Use    Smoking status: Never    Smokeless tobacco: Never   Vaping Use    Vaping status: Never Used   Substance and Sexual Activity    Alcohol use: No    Drug use: No    Sexual activity: Not Currently     Partners: Male     Social Drivers of Health     Financial Resource Strain: Low Risk  (2024)

## 2025-04-23 LAB
CREAT UR-MCNC: 201 MG/DL (ref 28–217)
MICROALBUMIN UR-MCNC: <12 MG/L (ref 0–20)
MICROALBUMIN/CREAT UR-RTO: NORMAL MCG/MG CREAT (ref 0–25)

## 2025-05-22 DIAGNOSIS — E11.9 TYPE 2 DIABETES MELLITUS WITHOUT COMPLICATION, WITH LONG-TERM CURRENT USE OF INSULIN (HCC): Primary | ICD-10-CM

## 2025-05-22 DIAGNOSIS — Z79.4 TYPE 2 DIABETES MELLITUS WITHOUT COMPLICATION, WITH LONG-TERM CURRENT USE OF INSULIN (HCC): Primary | ICD-10-CM

## 2025-05-22 RX ORDER — SEMAGLUTIDE 0.68 MG/ML
0.5 INJECTION, SOLUTION SUBCUTANEOUS WEEKLY
Qty: 6 ML | Refills: 0 | Status: CANCELLED | COMMUNITY
Start: 2025-05-22

## 2025-05-22 NOTE — TELEPHONE ENCOUNTER
Received PC from pt asking if Ozempic will be mailed to her or to the office. Per pt, dose was increased to 1 mg at last visit. Writer was not notified of dose change so medication change form has not yet been submitted to [employer] pt asst pgm. Pt states she has been taking two 0.5 mg dose shots weekly and is on her last pen. Writer advised it will take time to get new pens here, can provide 2 sample boxes of 0.5 mg dose. Writer advised pt to return to 0.5 mg dose so she is not without medication until higher doses arrive.    Script pended for sample boxes provided to pt.

## 2025-07-11 DIAGNOSIS — E58 CALCIUM DEFICIENCY: ICD-10-CM

## 2025-07-11 DIAGNOSIS — I10 PRIMARY HYPERTENSION: ICD-10-CM

## 2025-07-11 DIAGNOSIS — E11.9 TYPE 2 DIABETES MELLITUS WITHOUT COMPLICATION, WITH LONG-TERM CURRENT USE OF INSULIN (HCC): ICD-10-CM

## 2025-07-11 DIAGNOSIS — E55.9 VITAMIN D DEFICIENCY: ICD-10-CM

## 2025-07-11 DIAGNOSIS — E78.00 PURE HYPERCHOLESTEROLEMIA: ICD-10-CM

## 2025-07-11 DIAGNOSIS — Z79.4 TYPE 2 DIABETES MELLITUS WITHOUT COMPLICATION, WITH LONG-TERM CURRENT USE OF INSULIN (HCC): ICD-10-CM

## 2025-07-11 DIAGNOSIS — K21.9 GASTROESOPHAGEAL REFLUX DISEASE, UNSPECIFIED WHETHER ESOPHAGITIS PRESENT: ICD-10-CM

## 2025-07-11 NOTE — TELEPHONE ENCOUNTER
Request for   Requested Prescriptions     Pending Prescriptions Disp Refills    amLODIPine (NORVASC) 10 MG tablet 90 tablet 1     Sig: Take 1 tablet by mouth daily    atorvastatin (LIPITOR) 40 MG tablet 90 tablet 1     Sig: Take 1 tablet by mouth daily    calcium carb-cholecalciferol 600-5 MG-MCG TABS tablet 90 tablet 1     Sig: Take 1 tablet by mouth daily    carvedilol (COREG) 6.25 MG tablet 180 tablet 1     Sig: Take 1 tablet by mouth 2 times daily    losartan-hydroCHLOROthiazide (HYZAAR) 100-25 MG per tablet 90 tablet 1     Sig: Take 1 tablet by mouth daily    omeprazole (PRILOSEC) 20 MG delayed release capsule 90 capsule 1     Sig: Take 1 capsule by mouth every morning (before breakfast)    .      Please review and e-scribe to pharmacy listed in chart if appropriate. Thank you.      Last Visit Date: Visit date not found  Next Visit Date: Visit date not found    No future appointments.    Health Maintenance   Topic Date Due    Diabetic retinal exam  Never done    Lipids  08/02/2025    GFR test (Diabetes, CKD 3-4, OR last GFR 15-59)  08/02/2025    DTaP/Tdap/Td vaccine (1 - Tdap) 04/22/2026 (Originally 3/6/1982)    Shingles vaccine (1 of 2) 04/22/2026 (Originally 3/6/2013)    Pneumococcal 50+ years Vaccine (1 of 2 - PCV) 04/22/2026 (Originally 3/6/1982)    COVID-19 Vaccine (4 - 2024-25 season) 04/22/2026 (Originally 9/1/2024)    Respiratory Syncytial Virus (RSV) Pregnant or age 60 yrs+ (1 - Risk 60-74 years 1-dose series) 04/22/2026 (Originally 3/6/2023)    Flu vaccine (1) 08/01/2025    Annual Wellness Visit (Medicare)  08/03/2025    Diabetic foot exam  01/02/2026    A1C test (Diabetic or Prediabetic)  01/28/2026    Breast cancer screen  02/20/2026    Diabetic Alb to Cr ratio (uACR) test  04/22/2026    Depression Screen  04/22/2026    Colorectal Cancer Screen  10/27/2026    Cervical cancer screen  06/01/2028    Hepatitis C screen  Completed    HIV screen  Completed    Hepatitis A vaccine  Aged Out    Hepatitis B

## 2025-07-14 RX ORDER — AMLODIPINE BESYLATE 10 MG/1
10 TABLET ORAL DAILY
Qty: 90 TABLET | Refills: 1 | Status: SHIPPED | OUTPATIENT
Start: 2025-07-14

## 2025-07-14 RX ORDER — OMEPRAZOLE 20 MG/1
20 CAPSULE, DELAYED RELEASE ORAL
Qty: 90 CAPSULE | Refills: 1 | Status: SHIPPED | OUTPATIENT
Start: 2025-07-14

## 2025-07-14 RX ORDER — ASPIRIN 81 MG
1 TABLET, DELAYED RELEASE (ENTERIC COATED) ORAL DAILY
Qty: 90 TABLET | Refills: 1 | Status: SHIPPED | OUTPATIENT
Start: 2025-07-14

## 2025-07-14 RX ORDER — ATORVASTATIN CALCIUM 40 MG/1
40 TABLET, FILM COATED ORAL DAILY
Qty: 90 TABLET | Refills: 1 | Status: SHIPPED | OUTPATIENT
Start: 2025-07-14

## 2025-07-14 RX ORDER — LOSARTAN POTASSIUM AND HYDROCHLOROTHIAZIDE 25; 100 MG/1; MG/1
1 TABLET ORAL DAILY
Qty: 90 TABLET | Refills: 1 | Status: SHIPPED | OUTPATIENT
Start: 2025-07-14

## 2025-07-14 RX ORDER — CARVEDILOL 6.25 MG/1
6.25 TABLET ORAL 2 TIMES DAILY
Qty: 180 TABLET | Refills: 1 | Status: SHIPPED | OUTPATIENT
Start: 2025-07-14

## 2025-08-19 DIAGNOSIS — Z79.4 TYPE 2 DIABETES MELLITUS WITHOUT COMPLICATION, WITH LONG-TERM CURRENT USE OF INSULIN (HCC): Primary | ICD-10-CM

## 2025-08-19 DIAGNOSIS — E11.9 TYPE 2 DIABETES MELLITUS WITHOUT COMPLICATION, WITH LONG-TERM CURRENT USE OF INSULIN (HCC): Primary | ICD-10-CM

## (undated) DEVICE — STRAP,POSITIONING,KNEE/BODY,FOAM,4X60": Brand: MEDLINE

## (undated) DEVICE — 1016 S-DRAPE IRRIG POUCH 10/BOX: Brand: STERI-DRAPE™

## (undated) DEVICE — TUBING, SUCTION, 9/32" X 20', STRAIGHT: Brand: MEDLINE INDUSTRIES, INC.

## (undated) DEVICE — APPLICATOR FIBER TIP 16 IN CELLULOSE HD SWAB CHEVRON SCPET

## (undated) DEVICE — GLOVE ORANGE PI 8   MSG9080

## (undated) DEVICE — COUNTER NDL 10 COUNT HLD 20 FOAM BLK SGL MAG

## (undated) DEVICE — SVMMC GYN MIN PK

## (undated) DEVICE — CONTAINER,SPECIMEN,4OZ,OR STRL: Brand: MEDLINE

## (undated) DEVICE — YANKAUER,FLEXIBLE HANDLE,REGLR CAPACITY: Brand: MEDLINE INDUSTRIES, INC.

## (undated) DEVICE — GLOVE SURG SZ 6 THK91MIL LTX FREE SYN POLYISOPRENE ANTI

## (undated) DEVICE — SYRINGE,CONTROL,LL,FINGER,GRIP: Brand: MEDLINE INDUSTRIES, INC.

## (undated) DEVICE — ELECTRODE ES L11CM DIA5MM BALL SHFT RED DISP UTAHLOOP

## (undated) DEVICE — GOWN,AURORA,NONREINFORCED,LARGE: Brand: MEDLINE

## (undated) DEVICE — ELECTRODE PT RET AD L9FT HI MOIST COND ADH HYDRGEL CORDED

## (undated) DEVICE — NEEDLE SPINAL 22GA L3.5IN SPINOCAN

## (undated) DEVICE — DRAPE,REIN 53X77,STERILE: Brand: MEDLINE

## (undated) DEVICE — BLADE,CARBON-STEEL,11,STRL,DISPOSABLE,TB: Brand: MEDLINE

## (undated) DEVICE — BLADE,CARBON-STEEL,15,STRL,DISPOSABLE,TB: Brand: MEDLINE

## (undated) DEVICE — NEPTUNE E-SEP SMOKE EVACUATION PENCIL, COATED, 70MM BLADE, PUSH BUTTON SWITCH: Brand: NEPTUNE E-SEP

## (undated) DEVICE — SOLUTION SCRB 4OZ 2% CHG FOR SURG SCRBBING HND WSH

## (undated) DEVICE — COVER OR TBL W40XL90IN ABSRB STD AND GRIPPY BK SAHARA

## (undated) DEVICE — GOWN,SIRUS,NONRNF,SETINSLV,XL,20/CS: Brand: MEDLINE